# Patient Record
Sex: FEMALE | Race: WHITE
[De-identification: names, ages, dates, MRNs, and addresses within clinical notes are randomized per-mention and may not be internally consistent; named-entity substitution may affect disease eponyms.]

---

## 2018-06-30 ENCOUNTER — HOSPITAL ENCOUNTER (OUTPATIENT)
Dept: HOSPITAL 47 - EC | Age: 38
Setting detail: OBSERVATION
LOS: 1 days | Discharge: HOME | End: 2018-07-01
Attending: INTERNAL MEDICINE | Admitting: INTERNAL MEDICINE
Payer: COMMERCIAL

## 2018-06-30 VITALS — BODY MASS INDEX: 28 KG/M2

## 2018-06-30 VITALS — RESPIRATION RATE: 16 BRPM

## 2018-06-30 DIAGNOSIS — R49.0: ICD-10-CM

## 2018-06-30 DIAGNOSIS — Z82.49: ICD-10-CM

## 2018-06-30 DIAGNOSIS — Z83.3: ICD-10-CM

## 2018-06-30 DIAGNOSIS — Z85.41: ICD-10-CM

## 2018-06-30 DIAGNOSIS — L98.9: ICD-10-CM

## 2018-06-30 DIAGNOSIS — F17.200: ICD-10-CM

## 2018-06-30 DIAGNOSIS — D64.9: ICD-10-CM

## 2018-06-30 DIAGNOSIS — B35.3: ICD-10-CM

## 2018-06-30 DIAGNOSIS — L03.115: Primary | ICD-10-CM

## 2018-06-30 DIAGNOSIS — G43.909: ICD-10-CM

## 2018-06-30 DIAGNOSIS — M54.5: ICD-10-CM

## 2018-06-30 LAB
ALBUMIN SERPL-MCNC: 4.2 G/DL (ref 3.5–5)
ALP SERPL-CCNC: 75 U/L (ref 38–126)
ALT SERPL-CCNC: 25 U/L (ref 9–52)
ANION GAP SERPL CALC-SCNC: 13 MMOL/L
APTT BLD: 20.9 SEC (ref 22–30)
AST SERPL-CCNC: 21 U/L (ref 14–36)
BASOPHILS # BLD AUTO: 0 K/UL (ref 0–0.2)
BASOPHILS NFR BLD AUTO: 0 %
BUN SERPL-SCNC: 10 MG/DL (ref 7–17)
CALCIUM SPEC-MCNC: 9.1 MG/DL (ref 8.4–10.2)
CHLORIDE SERPL-SCNC: 105 MMOL/L (ref 98–107)
CO2 SERPL-SCNC: 21 MMOL/L (ref 22–30)
EOSINOPHIL # BLD AUTO: 0 K/UL (ref 0–0.7)
EOSINOPHIL NFR BLD AUTO: 0 %
ERYTHROCYTE [DISTWIDTH] IN BLOOD BY AUTOMATED COUNT: 4.32 M/UL (ref 3.8–5.4)
ERYTHROCYTE [DISTWIDTH] IN BLOOD: 13.1 % (ref 11.5–15.5)
GLUCOSE SERPL-MCNC: 100 MG/DL (ref 74–99)
HCT VFR BLD AUTO: 40.5 % (ref 34–46)
HGB BLD-MCNC: 13.9 GM/DL (ref 11.4–16)
INR PPP: 1 (ref ?–1.2)
KETONES UR QL STRIP.AUTO: (no result)
LYMPHOCYTES # SPEC AUTO: 0.6 K/UL (ref 1–4.8)
LYMPHOCYTES NFR SPEC AUTO: 7 %
MCH RBC QN AUTO: 32.2 PG (ref 25–35)
MCHC RBC AUTO-ENTMCNC: 34.4 G/DL (ref 31–37)
MCV RBC AUTO: 93.6 FL (ref 80–100)
MONOCYTES # BLD AUTO: 0.4 K/UL (ref 0–1)
MONOCYTES NFR BLD AUTO: 5 %
NEUTROPHILS # BLD AUTO: 7.5 K/UL (ref 1.3–7.7)
NEUTROPHILS NFR BLD AUTO: 86 %
PH UR: 6 [PH] (ref 5–8)
PLATELET # BLD AUTO: 289 K/UL (ref 150–450)
POTASSIUM SERPL-SCNC: 3.5 MMOL/L (ref 3.5–5.1)
PROT SERPL-MCNC: 6.8 G/DL (ref 6.3–8.2)
PT BLD: 9.7 SEC (ref 9–12)
RBC UR QL: 2 /HPF (ref 0–5)
SODIUM SERPL-SCNC: 139 MMOL/L (ref 137–145)
SP GR UR: 1 (ref 1–1.03)
SQUAMOUS UR QL AUTO: 1 /HPF (ref 0–4)
UROBILINOGEN UR QL STRIP: <2 MG/DL (ref ?–2)
WBC # BLD AUTO: 8.7 K/UL (ref 3.8–10.6)
WBC #/AREA URNS HPF: <1 /HPF (ref 0–5)

## 2018-06-30 PROCEDURE — 85027 COMPLETE CBC AUTOMATED: CPT

## 2018-06-30 PROCEDURE — 86140 C-REACTIVE PROTEIN: CPT

## 2018-06-30 PROCEDURE — 85652 RBC SED RATE AUTOMATED: CPT

## 2018-06-30 PROCEDURE — 93971 EXTREMITY STUDY: CPT

## 2018-06-30 PROCEDURE — 85025 COMPLETE CBC W/AUTO DIFF WBC: CPT

## 2018-06-30 PROCEDURE — 96365 THER/PROPH/DIAG IV INF INIT: CPT

## 2018-06-30 PROCEDURE — 87040 BLOOD CULTURE FOR BACTERIA: CPT

## 2018-06-30 PROCEDURE — 99285 EMERGENCY DEPT VISIT HI MDM: CPT

## 2018-06-30 PROCEDURE — 80053 COMPREHEN METABOLIC PANEL: CPT

## 2018-06-30 PROCEDURE — 96375 TX/PRO/DX INJ NEW DRUG ADDON: CPT

## 2018-06-30 PROCEDURE — 80048 BASIC METABOLIC PNL TOTAL CA: CPT

## 2018-06-30 PROCEDURE — 85610 PROTHROMBIN TIME: CPT

## 2018-06-30 PROCEDURE — 83605 ASSAY OF LACTIC ACID: CPT

## 2018-06-30 PROCEDURE — 85730 THROMBOPLASTIN TIME PARTIAL: CPT

## 2018-06-30 PROCEDURE — 87086 URINE CULTURE/COLONY COUNT: CPT

## 2018-06-30 PROCEDURE — 96376 TX/PRO/DX INJ SAME DRUG ADON: CPT

## 2018-06-30 PROCEDURE — 36415 COLL VENOUS BLD VENIPUNCTURE: CPT

## 2018-06-30 PROCEDURE — 81001 URINALYSIS AUTO W/SCOPE: CPT

## 2018-06-30 PROCEDURE — 96361 HYDRATE IV INFUSION ADD-ON: CPT

## 2018-06-30 PROCEDURE — 96372 THER/PROPH/DIAG INJ SC/IM: CPT

## 2018-06-30 RX ADMIN — NICARDIPINE HYDROCHLORIDE SCH MLS/HR: 2.5 INJECTION INTRAVENOUS at 11:00

## 2018-06-30 RX ADMIN — ACETAMINOPHEN AND CODEINE PHOSPHATE PRN EACH: 300; 30 TABLET ORAL at 23:30

## 2018-06-30 RX ADMIN — MORPHINE SULFATE PRN MG: 2 INJECTION, SOLUTION INTRAMUSCULAR; INTRAVENOUS at 20:17

## 2018-06-30 RX ADMIN — CEFAZOLIN SODIUM SCH MLS/HR: 1 INJECTION, SOLUTION INTRAVENOUS at 23:27

## 2018-06-30 RX ADMIN — CEFAZOLIN SODIUM SCH MLS/HR: 1 INJECTION, SOLUTION INTRAVENOUS at 17:50

## 2018-06-30 RX ADMIN — CEFAZOLIN SCH MLS/HR: 330 INJECTION, POWDER, FOR SOLUTION INTRAMUSCULAR; INTRAVENOUS at 15:54

## 2018-06-30 RX ADMIN — NICARDIPINE HYDROCHLORIDE SCH MLS/HR: 2.5 INJECTION INTRAVENOUS at 11:01

## 2018-06-30 RX ADMIN — ACETAMINOPHEN AND CODEINE PHOSPHATE PRN EACH: 300; 30 TABLET ORAL at 17:54

## 2018-06-30 RX ADMIN — MORPHINE SULFATE PRN MG: 2 INJECTION, SOLUTION INTRAMUSCULAR; INTRAVENOUS at 15:54

## 2018-06-30 RX ADMIN — HEPARIN SODIUM SCH UNIT: 5000 INJECTION, SOLUTION INTRAVENOUS; SUBCUTANEOUS at 23:27

## 2018-06-30 RX ADMIN — FAMOTIDINE SCH MG: 20 TABLET, FILM COATED ORAL at 20:18

## 2018-06-30 NOTE — US
EXAMINATION TYPE: US venous doppler duplex LE RT

 

DATE OF EXAM: 6/30/2018 11:35 AM

 

COMPARISON: NONE

 

CLINICAL HISTORY: Pain.

 

SIDE PERFORMED: Right  

 

TECHNIQUE:  The lower extremity deep venous system is examined utilizing real time linear array sonog
connie with graded compression, doppler sonography and color-flow sonography.

 

VESSELS IMAGED:

External Iliac Vein (EIV)

Common Femoral Vein

Deep Femoral Vein

Greater Saphenous Vein *

Femoral Vein

Popliteal Vein

Small Saphenous Vein *

Proximal Calf Veins

(* superficial vessels)

 

Grayscale, color Doppler, spectral Doppler imaging performed of the deep veins of the right lower ext
remity. Normal color flow, spectral waveform, compressibility noted of the deep veins of the right lo
wer extremity.

 

Right Leg:  Negative for DVT

 

 

IMPRESSION:   No evident deep venous arthrosis at or above the right knee

## 2018-06-30 NOTE — ED
Extremity Problem HPI





- General


Chief complaint: Extremity Problem,Nontraumatic


Stated complaint: right leg pain


Time Seen by Provider: 18 10:11


Source: patient, RN notes reviewed, old records reviewed


Mode of arrival: wheelchair


Limitations: no limitations





- History of Present Illness


Initial comments: 





Patient is a 37-year-old female presents emergency department today fevers 

chills and right leg pain.  She reports her pain feels similar to when she had 

cellulitis only before.  She reports that she has pain in the groin rating down 

the entire leg.  She reports that she's not had any Motrin or Tylenol or 

anything for pain relief.  Patient states that she does have some mild lower 

back pain.  Patient states that she was told that it is likely comfortable 

infection over her toes.  She denies any new cuts or breaks in the skin.  No 

falls.





- Related Data


 Home Medications











 Medication  Instructions  Recorded  Confirmed


 


No Known Home Medications  16











 Allergies











Allergy/AdvReac Type Severity Reaction Status Date / Time


 


No Known Allergies Allergy   Verified 18 10:10














Review of Systems


ROS Statement: 


Those systems with pertinent positive or pertinent negative responses have been 

documented in the HPI.





ROS Other: All systems not noted in ROS Statement are negative.





Past Medical History


Past Medical History: Cancer, Skin Disorder


Additional Past Medical History / Comment(s): migraines, hoarse voice and diff 

swallowing-recent choking, dry skin on foot, hx cellulitis, anemia, hx cervical 

cancer


History of Any Multi-Drug Resistant Organisms: None Reported


Past Surgical History:  Section, Orthopedic Surgery


Additional Past Surgical History / Comment(s): LEEP procedure, left knee 

arthroscopy, oral surgery


Past Anesthesia/Blood Transfusion Reactions: Previous Problems w/ Anesthesia


Additional Past Anesthesia/Blood Transfusion Reaction / Comment(s): spinal 

anesthesia for C/S-"stopped breathing/paralyzed lungs"


Past Psychological History: No Psychological Hx Reported


Smoking Status: Current some day smoker


Past Alcohol Use History: None Reported


Past Drug Use History: None Reported





- Past Family History


  ** Father


Family Medical History: Diabetes Mellitus, Hypertension





  ** Mother


Family Medical History: No Reported History





General Exam





- General Exam Comments


Initial Comments: 





Physical 37-year-old female.  Alert and oriented.  No acute distress.


General: Well appearing, well nourished, in no distress. Oriented x 3, normal 

mood and affect . Ambulating without difficulty. 


Skin: Good turgor, no rash, unusual bruising or prominent lesions 


Hair: Normal texture and distribution. 


HEENT: Head: Normocephalic, atraumatic, no visible or palpable masses, 

depressions, or scaring.


 Eyes: Visual acuity intact, conjunctiva clear, sclera non-icteric, EOM intact, 

PERRL. 


Ears: EACs clear, TMs translucent & cone of light visualized. hearing intact. 


Nose: No external lesions, mucosa non-inflamed, septum and turbinates normal 


Mouth: Mucous membranes moist, no mucosal lesions. 


Teeth/Gums: No obvious caries or periodontal disease. No gingival inflammation 

or significant resorption. 


Pharynx: Mucosa non-inflamed, no tonsillar hypertrophy or exudate 


Neck: Supple, without lesions, bruits, or adenopathy, thyroid non-enlarged and 

non-tender 


Heart: No cardiomegaly or thrills; regular rate and rhythm, no murmur or gallop 


Lungs: Clear to auscultation and percussion 


Abdomen: Bowel sounds normal, no tenderness, organomegaly, masses, or hernia 


Back: Spine normal without deformity or tenderness, no CVA tenderness 


Extremities: No amputations or deformities.  Patient reports pain over the 

right lower extremity.  Patient is tender over the right hip, but she is tender 

over the entire leg.  Full range of motion noted of the toes ankle and knee.  

Mild erythema over the lateral aspect of the shin.  Pulses are normal 

bilaterally.  Neurovascularly intact.


Musculoskeletal: Normal gait and station. No misalignment, asymmetry, 

crepitation, defects, tenderness, masses, effusions, decreased range of motion, 

instability, atrophy or abnormal strength or tone in the head, neck, spine, ribs

, pelvis or extremities. 


Neurologic: CN 2-12 normal. Sensation to pain, touch, and proprioception 

normal. DTRs normal in upper and lower extremities. No pathologic reflexes. 


Psychiatric: Oriented X3, intact recent and remote memory, judgment and insight

, normal mood and affect.





Limitations: no limitations





Course


 Vital Signs











  18





  10:07 11:44


 


Temperature 99.2 F 


 


Pulse Rate 100 92


 


Respiratory 24 18





Rate  


 


Blood Pressure 126/85 106/56


 


O2 Sat by Pulse 100 98





Oximetry  














Medical Decision Making





- Medical Decision Making





37-year-old female with history of cellulitis on her right leg.  She presents 

today with severe pain over the right leg.  Some mild erythema over the lateral 

aspect of the calf.  Ultrasound was negative for DVT.  No strain or falls no 

trauma.  She has full range of motion of the leg which she does report pain.  

Patient to arrive with a low-grade temperature of 100.0.  Her lab work shows an 

elevated CRP and ESR.  White blood cell count is within normal limits.  Dr. Jones

, pulse was examined the Patient.  At this time I'll put the Patient on Ancef.  

Admitted for right leg cellulitis over the right calf.  With pain control and 

IV fluids.





- Lab Data


Result diagrams: 


 18 10:42





 18 10:42


 Lab Results











  18 Range/Units





  10:42 10:42 10:42 


 


WBC  8.7    (3.8-10.6)  k/uL


 


RBC  4.32    (3.80-5.40)  m/uL


 


Hgb  13.9    (11.4-16.0)  gm/dL


 


Hct  40.5    (34.0-46.0)  %


 


MCV  93.6    (80.0-100.0)  fL


 


MCH  32.2    (25.0-35.0)  pg


 


MCHC  34.4    (31.0-37.0)  g/dL


 


RDW  13.1    (11.5-15.5)  %


 


Plt Count  289    (150-450)  k/uL


 


Neutrophils %  86    %


 


Lymphocytes %  7    %


 


Monocytes %  5    %


 


Eosinophils %  0    %


 


Basophils %  0    %


 


Neutrophils #  7.5    (1.3-7.7)  k/uL


 


Lymphocytes #  0.6 L    (1.0-4.8)  k/uL


 


Monocytes #  0.4    (0-1.0)  k/uL


 


Eosinophils #  0.0    (0-0.7)  k/uL


 


Basophils #  0.0    (0-0.2)  k/uL


 


ESR  38 H    (0-20)  mm/hr


 


PT    9.7  (9.0-12.0)  sec


 


INR    1.0  (<1.2)  


 


APTT    20.9 L  (22.0-30.0)  sec


 


Sodium   139   (137-145)  mmol/L


 


Potassium   3.5   (3.5-5.1)  mmol/L


 


Chloride   105   ()  mmol/L


 


Carbon Dioxide   21 L   (22-30)  mmol/L


 


Anion Gap   13   mmol/L


 


BUN   10   (7-17)  mg/dL


 


Creatinine   0.80   (0.52-1.04)  mg/dL


 


Est GFR (CKD-EPI)AfAm   >90   (>60 ml/min/1.73 sqM)  


 


Est GFR (CKD-EPI)NonAf   >90   (>60 ml/min/1.73 sqM)  


 


Glucose   100 H   (74-99)  mg/dL


 


Calcium   9.1   (8.4-10.2)  mg/dL


 


Total Bilirubin   0.6   (0.2-1.3)  mg/dL


 


AST   21   (14-36)  U/L


 


ALT   25   (9-52)  U/L


 


Alkaline Phosphatase   75   ()  U/L


 


C-Reactive Protein   181.4 H   (<10.0)  mg/L


 


Total Protein   6.8   (6.3-8.2)  g/dL


 


Albumin   4.2   (3.5-5.0)  g/dL














- Radiology Data


Radiology results: report reviewed


Ultrasound is negative for any DVT.





Disposition


Clinical Impression: 


 Cellulitis of right leg





Disposition: HOME SELF-CARE


Condition: Good


Is patient prescribed a controlled substance at d/c from ED?: No


When asked, does pt state using other controlled substances?: No


If prescribed controlled substance>3 days was MAPS reviewed?: No


If opioid is for acute pain is fill amount 7 days or less?: No


If Rx opioid, was Start Talking consent form obtained?: No


Referrals: 


Castro Mendez MD [Primary Care Provider] - 1-2 days


Time of Disposition: 13:13

## 2018-06-30 NOTE — P.HPIM
History of Present Illness


Patient was an 37-year-old female came in with compensative for redness in the 

right leg fever chills has been going on since last Thursday.  Patient is 

negative for DVT in the right leg.  Patient although redness in the right leg 

is not significant patient does have significant lymphangitic streaking, never 

had any history of MRSA in the past.  Patient does have interdigital, toe skin 

breakdown and possible fungal infection which is probably the nidus of her 

cellulitis.  We'll consult infectious disease regarding local wound care for 

interdigital skin breakdown.  Patient will be started on ceftezole and.  With 

the GI prophylaxis and DVT prophylaxis.  Patient had fever at home.





Review of Systems


REVIEW OF SYSTEMS: 


CONSTITUTIONAL:  no malaise, no fatigue. 


HEENT: No recent visual problems or hearing problems. Denied any sore throat. 


CARDIOVASCULAR: No chest pain, orthopnea, PND, no palpitations, no syncope. 


PULMONARY: No shortness of breath, no cough, no hemoptysis. 


GASTROINTESTINAL: No diarrhea, no nausea, no vomiting, no abdominal pain. 

Normoactive bowel sounds. 


NEUROLOGICAL: No headaches, no weakness, no numbness. 


HEMATOLOGICAL: Denies any bleeding or petechiae. 


GENITOURINARY: Denies any burning micturition, frequency, or urgency. 


MUSCULOSKELETAL/RHEUMATOLOGICAL: Denies any joint pain, swelling, or any muscle 

pain. 


ENDOCRINE: Denies any polyuria or polydipsia. 





The rest of the 14-point review of systems is negative.











Past Medical History


Past Medical History: Cancer, Skin Disorder


Additional Past Medical History / Comment(s): migraines, hoarse voice and diff 

swallowing-recent choking, dry skin on foot, hx cellulitis, anemia, hx cervical 

cancer


History of Any Multi-Drug Resistant Organisms: None Reported


Past Surgical History:  Section, Orthopedic Surgery


Additional Past Surgical History / Comment(s): LEEP procedure, left knee 

arthroscopy, oral surgery


Past Anesthesia/Blood Transfusion Reactions: Previous Problems w/ Anesthesia


Additional Past Anesthesia/Blood Transfusion Reaction / Comment(s): spinal 

anesthesia for C/S-"stopped breathing/paralyzed lungs"


Past Psychological History: No Psychological Hx Reported


Smoking Status: Current some day smoker


Past Alcohol Use History: None Reported


Additional Past Alcohol Use History / Comment(s): occ smoking


Past Drug Use History: None Reported





- Past Family History


  ** Father


Family Medical History: Diabetes Mellitus, Hypertension





  ** Mother


Family Medical History: No Reported History





Medications and Allergies


 Home Medications











 Medication  Instructions  Recorded  Confirmed  Type


 


Ibuprofen [Motrin Ib] 200 mg PO Q6H PRN 18 History











 Allergies











Allergy/AdvReac Type Severity Reaction Status Date / Time


 


No Known Allergies Allergy   Verified 18 13:39














Physical Exam


Vitals: 


 Vital Signs











  Temp Pulse Resp BP Pulse Ox


 


 18 14:35  98.6 F  88  16  111/59  99


 


 18 13:27  98.6 F  85  18  112/59  99


 


 18 11:44   92  18  106/56  98


 


 18 10:07  99.2 F  100  24  126/85  100








 Intake and Output











 18





 06:59 14:59 22:59


 


Other:   


 


  Weight  63.503 kg 65 kg











PHYSICAL EXAMINATION: 





GENERAL: The patient is alert and oriented x3, not in any acute distress. Well 

developed, well nourished. 


HEENT: Pupils are round and equally reacting to light. EOMI. No scleral 

icterus. No conjunctival pallor. Normocephalic, atraumatic. No pharyngeal 

erythema. No thyromegaly. 


CARDIOVASCULAR: S1 and S2 present. No murmurs, rubs, or gallops. 


PULMONARY: Chest is clear to auscultation, no wheezing or crackles. 


ABDOMEN: Soft, nontender, nondistended, normoactive bowel sounds. No palpable 

organomegaly. 


MUSCULOSKELETAL: No joint swelling or deformity.


EXTREMITIES: No cyanosis, clubbing, or pedal edema. 


NEUROLOGICAL: Gross neurological examination did not reveal any focal deficits. 


SKIN: As mentioned in HPI











Results


CBC & Chem 7: 


 18 10:42





 18 10:42


Labs: 


 Abnormal Lab Results - Last 24 Hours (Table)











  18 Range/Units





  10:42 10:42 10:42 


 


Lymphocytes #  0.6 L    (1.0-4.8)  k/uL


 


ESR  38 H    (0-20)  mm/hr


 


APTT    20.9 L  (22.0-30.0)  sec


 


Carbon Dioxide   21 L   (22-30)  mmol/L


 


Glucose   100 H   (74-99)  mg/dL


 


C-Reactive Protein   181.4 H   (<10.0)  mg/L


 


Urine Ketones     (Negative)  


 


Urine Blood     (Negative)  


 


Urine Bacteria     (None)  /hpf


 


Urine Mucus     (None)  /hpf














  18 Range/Units





  13:04 


 


Lymphocytes #   (1.0-4.8)  k/uL


 


ESR   (0-20)  mm/hr


 


APTT   (22.0-30.0)  sec


 


Carbon Dioxide   (22-30)  mmol/L


 


Glucose   (74-99)  mg/dL


 


C-Reactive Protein   (<10.0)  mg/L


 


Urine Ketones  1+ H  (Negative)  


 


Urine Blood  Small H  (Negative)  


 


Urine Bacteria  Rare H  (None)  /hpf


 


Urine Mucus  Rare H  (None)  /hpf














Thrombosis Risk Factor Assmnt





- Choose All That Apply


Any of the Below Risk Factors Present?: No


Other Risk Factors: No


Other congenital or acquired thrombophilia - If yes, enter type in comment: No


Thrombosis Risk Factor Assessment Level: Very Low Risk





Assessment and Plan


Plan: 


-Cellulitis of the right lower extremity: Most probably streptococcal in nature 

patient was started on ceftezole and IV fluids will be continued.  Repeat CBC 

tomorrow.


-Interdigital fungal infection: Local wound care as per infectious disease


-Ruled out DVT


-Nicotine abuse: Counseling was provided.

## 2018-07-01 VITALS — DIASTOLIC BLOOD PRESSURE: 57 MMHG | TEMPERATURE: 97.9 F | HEART RATE: 80 BPM | SYSTOLIC BLOOD PRESSURE: 92 MMHG

## 2018-07-01 LAB
ANION GAP SERPL CALC-SCNC: 7 MMOL/L
BUN SERPL-SCNC: 7 MG/DL (ref 7–17)
CALCIUM SPEC-MCNC: 7 MG/DL (ref 8.4–10.2)
CHLORIDE SERPL-SCNC: 112 MMOL/L (ref 98–107)
CO2 SERPL-SCNC: 23 MMOL/L (ref 22–30)
ERYTHROCYTE [DISTWIDTH] IN BLOOD BY AUTOMATED COUNT: 3.21 M/UL (ref 3.8–5.4)
ERYTHROCYTE [DISTWIDTH] IN BLOOD: 13.2 % (ref 11.5–15.5)
GLUCOSE SERPL-MCNC: 86 MG/DL (ref 74–99)
HCT VFR BLD AUTO: 30.9 % (ref 34–46)
HGB BLD-MCNC: 10.1 GM/DL (ref 11.4–16)
MCH RBC QN AUTO: 31.6 PG (ref 25–35)
MCHC RBC AUTO-ENTMCNC: 32.8 G/DL (ref 31–37)
MCV RBC AUTO: 96.3 FL (ref 80–100)
PLATELET # BLD AUTO: 213 K/UL (ref 150–450)
POTASSIUM SERPL-SCNC: 3.6 MMOL/L (ref 3.5–5.1)
SODIUM SERPL-SCNC: 142 MMOL/L (ref 137–145)
WBC # BLD AUTO: 4.6 K/UL (ref 3.8–10.6)

## 2018-07-01 RX ADMIN — CEFAZOLIN SODIUM SCH MLS/HR: 1 INJECTION, SOLUTION INTRAVENOUS at 12:20

## 2018-07-01 RX ADMIN — FAMOTIDINE SCH MG: 20 TABLET, FILM COATED ORAL at 08:12

## 2018-07-01 RX ADMIN — ACETAMINOPHEN AND CODEINE PHOSPHATE PRN EACH: 300; 30 TABLET ORAL at 04:58

## 2018-07-01 RX ADMIN — CEFAZOLIN SCH MLS/HR: 330 INJECTION, POWDER, FOR SOLUTION INTRAMUSCULAR; INTRAVENOUS at 05:01

## 2018-07-01 RX ADMIN — HEPARIN SODIUM SCH UNIT: 5000 INJECTION, SOLUTION INTRAVENOUS; SUBCUTANEOUS at 08:12

## 2018-07-01 RX ADMIN — CEFAZOLIN SCH MLS/HR: 330 INJECTION, POWDER, FOR SOLUTION INTRAMUSCULAR; INTRAVENOUS at 12:21

## 2018-07-01 RX ADMIN — CEFAZOLIN SODIUM SCH MLS/HR: 1 INJECTION, SOLUTION INTRAVENOUS at 05:28

## 2018-07-01 NOTE — P.CONS
History of Present Illness





- Reason for Consult


Consult date: 18





- History of Present Illness


29-year-old  female known to infectious disease service now for her 

fifth episode of cellulitis the right lower extremity.  She relates that the 

ages 16 she had her first episode.  He does continue to have intermittent bouts 

of this.  Is a chronic skin condition thought to be either eczema or psoriasis 

that then becomes secondarily fungally infected.  His this occurs she then 

develops the significant cellulitis and ascending lymphangitis.  At home she 

had onset of significant pain and swelling to the limb and because of her 

history she presented to Hospital and was admitted for further intervention.  

At this time fortunately she started to show improvement already.  The case is 

discussed with with the hospitalist.


Patient is currently with much improved symptoms.  She is having no high-grade 

fever chills or rigors.  Pain is improved.  She believes that she would be able 

to take oral antibiotic therapy.





Review of Systems


HEENT:Denies headache or acute visual change.  Denies sinus or mouth 

discomforts.  Denies neck stiffness or pain.  Denies significant oral cavity 

pain.  Denies difficulty on swallowing.





Lungs: Denies significant shortness of breath, cough, sputum production, or 

hemoptysis.





Cardiovascular: Denies significant shortness of breath, chest pain, chest wall 

pain, 


orthopnea, dyspnea on exertion, syncope





Gastrointestinal:Denies nausea, vomiting, diarrhea, constipation, hematemesis, 

melena, hematochezia.  No no significant change of bowel habit noticed.





Musculoskeletal: denies significant myalgias or arthralgias.  No new joint 

swelling.  Denies new back pain.





Skin: Chronic rash right foot at the lateral surface of the toes 





Neuro: Denies headache or visual change.  Denies any new onset weakness or 

difficulty with ambulation.  Denies falls or seizures.





Psychiatric:Denies anxiety or depression.





Endocrine: Denies significant fatigue, denies significant weight loss or weight 

gain.




















Past Medical History


Past Medical History: Cancer, Skin Disorder


Additional Past Medical History / Comment(s): migraines, hoarse voice and diff 

swallowing-recent choking, dry skin on foot, hx cellulitis, anemia, hx cervical 

cancer


History of Any Multi-Drug Resistant Organisms: None Reported


Past Surgical History:  Section, Orthopedic Surgery


Additional Past Surgical History / Comment(s): LEEP procedure, left knee 

arthroscopy, oral surgery


Past Anesthesia/Blood Transfusion Reactions: Previous Problems w/ Anesthesia


Additional Past Anesthesia/Blood Transfusion Reaction / Comm: spinal anesthesia 

for C/S-"stopped breathing/paralyzed lungs"


Past Psychological History: No Psychological Hx Reported


Additional Psychological History / Comment(s): She has been a smoker on and off 

for 20 years.  She denies any medical marijuana, marijuana, street drug use.  

She denies any alcohol use.  She lives at home with family members and her 

children.  There are no pets in the home.  She has had no recent travel.  Past 

Drug Use History: None Reported


Smoking Status: Current some day smoker


Past Alcohol Use History: None Reported


Additional Past Alcohol Use History / Comment(s): occ smoking


Past Drug Use History: None Reported





- Past Family History


  ** Father


Family Medical History: Diabetes Mellitus, Hypertension





  ** Mother


Family Medical History: No Reported History





Medications and Allergies


Home Medications and Allergies Comment(s): 


Please see the medication list.





 Home Medications











 Medication  Instructions  Recorded  Confirmed  Type


 


Ibuprofen [Motrin Ib] 200 mg PO Q6H PRN 18 History


 


Cephalexin [Keflex] 500 mg PO Q8HR #21 cap 18  Rx











 Allergies











Allergy/AdvReac Type Severity Reaction Status Date / Time


 


No Known Allergies Allergy   Verified 18 13:39














Physical Exam


Vitals: 


 Vital Signs











  Temp Pulse Resp BP Pulse Ox


 


 18 05:00  97.9 F  80  16  92/57  93 L


 


 18 22:33  99 F  76  16  101/66  98


 


 18 15:35  98.0 F  70  16  98/64  97








 Intake and Output











 18





 06:59 14:59 22:59


 


Intake Total 1000 1110 


 


Balance 1000 1110 


 


Intake:   


 


  Intake, IV Titration 1000 1110 





  Amount   


 


    Sodium Chloride 0.9% 1, 900 960 





    000 ml @ 120 mls/hr IV .   





    Q8H20M [facility] Rx#:[id number]   


 


    ceFAZolin 1,000 mg In 100 150 





    Dextrose/Water 1 50ml.bag   





    @ 100 mls/hr IVPB Q6HR   





    [facility] Rx#:[id number]   


 


Other:   


 


  Voiding Method Toilet  


 


  # Voids 2  











Pleasant 29-year-old woman who is not in acute distress.


HEENT: Anicteric conjunctiva are pink and moist nasal mucosa grossly intact 

without significant lesions, there is no thrush.


Neck: The neck is supple without significant lymphadenopathy or thyromegaly.


Lungs: Good bilateral air entry without significant crackles or wheezing.  

There is no significant bronchial sounds.  There is no egophony or dullness.


Heart: Regular rate and rhythm with an audible S1-S2, no S3 no S4.  There is no 

significant murmur click or rub, PMI was nondisplaced.


Abdomen: Positive bowel sounds soft and nontender without palpable masses or 

organomegaly.  There was no guarding or rebound.


Extremities: The upper extremities have excellent pulses they are symmetric, no 

significant petechiae or telangiectasia.  No splinter hemorrhages were noted.  

The left lower extremity has no evidence of any edema.  The right lower 

extremity has evidence of some minimal edema to the distal aspect of the right 

foot which is evidence of the significant dryness and fungal process possibly 

superimposed on the chronic eczema-type condition.  There is no expressible 

purulence.  It is minimally tender.  There is minimal erythema to the site.  

There is no ascending lymphangitis.  There is no tenderness in the right 

inguinal area and no lymphadenopathy is noted in the right inguinal area and no 

other abnormal lymph nodes are noted at this time.


Neuro: Awake alert oriented to person place and time.  There are no acute new 

gross focal sensory motor deficits.











Results


CBC & Chem 7: 


 18 08:02





 18 08:02


Labs: 


 Abnormal Lab Results - Last 24 Hours (Table)











  18 Range/Units





  08:02 08:02 


 


RBC  3.21 L   (3.80-5.40)  m/uL


 


Hgb  10.1 L D   (11.4-16.0)  gm/dL


 


Hct  30.9 L   (34.0-46.0)  %


 


Chloride   112 H  ()  mmol/L


 


Calcium   7.0 L  (8.4-10.2)  mg/dL








 Microbiology - Last 24 Hours (Table)











 18 10:42 Blood Culture - Preliminary





 Blood    No Growth after 24 hours


 


 18 13:04 Urine Culture - Preliminary





 Urine,Clean Catch 








 Laboratory Results











WBC  4.6 k/uL (3.8-10.6)   18  08:02    


 


RBC  3.21 m/uL (3.80-5.40)  L  18  08:02    


 


Hgb  10.1 gm/dL (11.4-16.0)  L D 18  08:02    


 


Hct  30.9 % (34.0-46.0)  L  18  08:02    


 


MCV  96.3 fL (80.0-100.0)   18  08:02    


 


MCH  31.6 pg (25.0-35.0)   18  08:02    


 


MCHC  32.8 g/dL (31.0-37.0)   18  08:02    


 


RDW  13.2 % (11.5-15.5)   18  08:02    


 


Plt Count  213 k/uL (150-450)   18  08:02    


 


Neutrophils %  86 %  18  10:42    


 


Lymphocytes %  7 %  18  10:42    


 


Monocytes %  5 %  18  10:42    


 


Eosinophils %  0 %  18  10:42    


 


Basophils %  0 %  18  10:42    


 


Neutrophils #  7.5 k/uL (1.3-7.7)   18  10:42    


 


Lymphocytes #  0.6 k/uL (1.0-4.8)  L  18  10:42    


 


Monocytes #  0.4 k/uL (0-1.0)   18  10:42    


 


Eosinophils #  0.0 k/uL (0-0.7)   18  10:42    


 


Basophils #  0.0 k/uL (0-0.2)   18  10:42    


 


ESR  38 mm/hr (0-20)  H  18  10:42    


 


PT  9.7 sec (9.0-12.0)   18  10:42    


 


INR  1.0  (<1.2)   18  10:42    


 


APTT  20.9 sec (22.0-30.0)  L  18  10:42    


 


Sodium  142 mmol/L (137-145)   18  08:02    


 


Potassium  3.6 mmol/L (3.5-5.1)   18  08:02    


 


Chloride  112 mmol/L ()  H  18  08:02    


 


Carbon Dioxide  23 mmol/L (22-30)   18  08:02    


 


Anion Gap  7 mmol/L  18  08:02    


 


BUN  7 mg/dL (7-17)   18  08:02    


 


Creatinine  0.74 mg/dL (0.52-1.04)   18  08:02    


 


Est GFR (CKD-EPI)AfAm  >90  (>60 ml/min/1.73 sqM)   18  08:02    


 


Est GFR (CKD-EPI)NonAf  >90  (>60 ml/min/1.73 sqM)   18  08:02    


 


Glucose  86 mg/dL (74-99)   18  08:02    


 


Plasma Lactic Acid Antonio  1.1 mmol/L (0.7-2.0)   18  10:42    


 


Calcium  7.0 mg/dL (8.4-10.2)  L  18  08:02    


 


Total Bilirubin  0.6 mg/dL (0.2-1.3)   18  10:42    


 


AST  21 U/L (14-36)   18  10:42    


 


ALT  25 U/L (9-52)   18  10:42    


 


Alkaline Phosphatase  75 U/L ()   18  10:42    


 


C-Reactive Protein  181.4 mg/L (<10.0)  H  18  10:42    


 


Total Protein  6.8 g/dL (6.3-8.2)   18  10:42    


 


Albumin  4.2 g/dL (3.5-5.0)   18  10:42    


 


Urine Color  Colorless   18  13:04    


 


Urine Appearance  Clear  (Clear)   18  13:04    


 


Urine pH  6.0  (5.0-8.0)   18  13:04    


 


Ur Specific Gravity  1.003  (1.001-1.035)   18  13:04    


 


Urine Protein  Negative  (Negative)   18  13:04    


 


Urine Glucose (UA)  Negative  (Negative)   18  13:04    


 


Urine Ketones  1+  (Negative)  H  18  13:04    


 


Urine Blood  Small  (Negative)  H  18  13:04    


 


Urine Nitrite  Negative  (Negative)   18  13:04    


 


Urine Bilirubin  Negative  (Negative)   18  13:04    


 


Urine Urobilinogen  <2.0 mg/dL (<2.0)   18  13:04    


 


Ur Leukocyte Esterase  Negative  (Negative)   18  13:04    


 


Urine RBC  2 /hpf (0-5)   18  13:04    


 


Urine WBC  <1 /hpf (0-5)   18  13:04    


 


Ur Squamous Epith Cells  1 /hpf (0-4)   18  13:04    


 


Urine Bacteria  Rare /hpf (None)  H  18  13:04    


 


Urine Mucus  Rare /hpf (None)  H  18  13:04    








Microbiology





18 10:42   Blood   Blood Culture - Preliminary


                            No Growth after 24 hours


18 13:04   Urine,Clean Catch   Urine Culture - Preliminary











Assessment and Plan


(1) Cellulitis of right leg


Narrative/Plan: 


37-year-old woman who has a history of recurrent cellulitis of right lower 

extremity possibly in the basis of the chronic skin condition that causes skin 

breakdown, secondary fungal infection and then the invasive bacterial 

infection.  At this time the episode is quite mild compared to the one from 4 

years ago.  She is doing well on current antibiotic therapy.  He was discharged 

home on oral Keflex, but also placed on some oral Diflucan for short period of 

time to more effectively deal with the secondary fungal infection.  Topical 

antifungal can also be applied for some powders from over-the-counter.  His 

been 4 years since her last episode does appear to have better control of this 

issue.  Hopefully she'll keep the area bit more dry and prevent further 

secondary infections.


Status: Acute   Code(s): L03.115 - CELLULITIS OF RIGHT LOWER LIMB   SNOMED Code(

s): 751979165

## 2018-07-01 NOTE — P.DS
Providers


Date of admission: 


06/30/18 13:30





Attending physician: 


Pee Donis





Consults: 





 





06/30/18 16:47


Consult Physician Routine 


   Consulting Provider: Jhonny Pena Reason/Comments: Cellulitis and interdigital (toe) infection


   Do you want consulting provider notified?: Yes











Primary care physician: 


Vanessa Erazo





[facility] Course: 


31-year-old admitted with right lower limb cellulitis.  Patient has significant 

improvement in symptoms on ceftezole and patient will be discharged on Keflex 

for a week.  Awaiting Dr. Pena recommendations regarding interdigital fungal 

infection of the right leg toes.  Once a valid by infectious disease patient 

will be discharged on Keflex.





PHYSICAL EXAMINATION: 





GENERAL: The patient is alert and oriented x3, not in any acute distress. Well 

developed, well nourished. 


HEENT: Pupils are round and equally reacting to light. EOMI. No scleral 

icterus. No conjunctival pallor. Normocephalic, atraumatic. No pharyngeal 

erythema. No thyromegaly. 


CARDIOVASCULAR: S1 and S2 present. No murmurs, rubs, or gallops. 


PULMONARY: Chest is clear to auscultation, no wheezing or crackles. 


ABDOMEN: Soft, nontender, nondistended, normoactive bowel sounds. No palpable 

organomegaly. 


MUSCULOSKELETAL: No joint swelling or deformity.


EXTREMITIES: No cyanosis, clubbing, or pedal edema. 


NEUROLOGICAL: Gross neurological examination did not reveal any focal deficits. 


SKIN: Redness in the right leg signify failure improved patient does have 

interdigital fungal infection.





Assessment and Plan


Plan: 


-Cellulitis of the right lower extremity: Most probably streptococcal in nature 


-Interdigital fungal infection: Local wound care as per infectious disease


-Ruled out DVT


-Nicotine abuse: Counseling was provided.








Patient Condition at Discharge: Good





Plan - Discharge Summary


New Discharge Prescriptions: 


New


   Cephalexin [Keflex] 500 mg PO Q8HR #21 cap





No Action


   Ibuprofen [Motrin Ib] 200 mg PO Q6H PRN


     PRN Reason: Pain


Discharge Medication List





Ibuprofen [Motrin Ib] 200 mg PO Q6H PRN 06/30/18 [History]


Cephalexin [Keflex] 500 mg PO Q8HR #21 cap 07/01/18 [Rx]








Follow up Appointment(s)/Referral(s): 


Castro Mendez MD [Primary Care Provider] - 3 Days


Discharge Disposition: HOME SELF-CARE

## 2018-12-15 ENCOUNTER — HOSPITAL ENCOUNTER (INPATIENT)
Dept: HOSPITAL 47 - EC | Age: 38
LOS: 5 days | Discharge: HOME | DRG: 871 | End: 2018-12-20
Attending: HOSPITALIST | Admitting: HOSPITALIST
Payer: COMMERCIAL

## 2018-12-15 VITALS — BODY MASS INDEX: 28.8 KG/M2

## 2018-12-15 DIAGNOSIS — F17.200: ICD-10-CM

## 2018-12-15 DIAGNOSIS — E87.70: ICD-10-CM

## 2018-12-15 DIAGNOSIS — B35.3: ICD-10-CM

## 2018-12-15 DIAGNOSIS — D64.9: ICD-10-CM

## 2018-12-15 DIAGNOSIS — Z83.3: ICD-10-CM

## 2018-12-15 DIAGNOSIS — J96.01: ICD-10-CM

## 2018-12-15 DIAGNOSIS — M19.90: ICD-10-CM

## 2018-12-15 DIAGNOSIS — A40.0: Primary | ICD-10-CM

## 2018-12-15 DIAGNOSIS — K21.9: ICD-10-CM

## 2018-12-15 DIAGNOSIS — Z98.891: ICD-10-CM

## 2018-12-15 DIAGNOSIS — E83.42: ICD-10-CM

## 2018-12-15 DIAGNOSIS — L03.115: ICD-10-CM

## 2018-12-15 DIAGNOSIS — R65.21: ICD-10-CM

## 2018-12-15 DIAGNOSIS — E87.2: ICD-10-CM

## 2018-12-15 DIAGNOSIS — Z71.6: ICD-10-CM

## 2018-12-15 DIAGNOSIS — G43.909: ICD-10-CM

## 2018-12-15 DIAGNOSIS — Z86.19: ICD-10-CM

## 2018-12-15 DIAGNOSIS — J90: ICD-10-CM

## 2018-12-15 DIAGNOSIS — R13.10: ICD-10-CM

## 2018-12-15 DIAGNOSIS — Z85.41: ICD-10-CM

## 2018-12-15 DIAGNOSIS — R49.0: ICD-10-CM

## 2018-12-15 DIAGNOSIS — Z82.49: ICD-10-CM

## 2018-12-15 LAB
ALBUMIN SERPL-MCNC: 4.2 G/DL (ref 3.5–5)
ALP SERPL-CCNC: 67 U/L (ref 38–126)
ALT SERPL-CCNC: 25 U/L (ref 9–52)
ANION GAP SERPL CALC-SCNC: 14 MMOL/L
APTT BLD: 19 SEC (ref 22–30)
AST SERPL-CCNC: 30 U/L (ref 14–36)
BASOPHILS # BLD AUTO: 0 K/UL (ref 0–0.2)
BASOPHILS NFR BLD AUTO: 0 %
BUN SERPL-SCNC: 12 MG/DL (ref 7–17)
CALCIUM SPEC-MCNC: 9.9 MG/DL (ref 8.4–10.2)
CHLORIDE SERPL-SCNC: 106 MMOL/L (ref 98–107)
CO2 SERPL-SCNC: 21 MMOL/L (ref 22–30)
EOSINOPHIL # BLD AUTO: 0.1 K/UL (ref 0–0.7)
EOSINOPHIL NFR BLD AUTO: 1 %
ERYTHROCYTE [DISTWIDTH] IN BLOOD BY AUTOMATED COUNT: 4.51 M/UL (ref 3.8–5.4)
ERYTHROCYTE [DISTWIDTH] IN BLOOD: 13.2 % (ref 11.5–15.5)
GLUCOSE SERPL-MCNC: 94 MG/DL (ref 74–99)
HCT VFR BLD AUTO: 43.9 % (ref 34–46)
HGB BLD-MCNC: 14.3 GM/DL (ref 11.4–16)
INR PPP: 0.9 (ref ?–1.2)
LYMPHOCYTES # SPEC AUTO: 0.2 K/UL (ref 1–4.8)
LYMPHOCYTES NFR SPEC AUTO: 2 %
MCH RBC QN AUTO: 31.8 PG (ref 25–35)
MCHC RBC AUTO-ENTMCNC: 32.6 G/DL (ref 31–37)
MCV RBC AUTO: 97.4 FL (ref 80–100)
MONOCYTES # BLD AUTO: 0.2 K/UL (ref 0–1)
MONOCYTES NFR BLD AUTO: 2 %
NEUTROPHILS # BLD AUTO: 12.7 K/UL (ref 1.3–7.7)
NEUTROPHILS NFR BLD AUTO: 96 %
PH UR: 6.5 [PH] (ref 5–8)
PLATELET # BLD AUTO: 268 K/UL (ref 150–450)
POTASSIUM SERPL-SCNC: 4.2 MMOL/L (ref 3.5–5.1)
PROT SERPL-MCNC: 7.1 G/DL (ref 6.3–8.2)
PT BLD: 9.7 SEC (ref 9–12)
RBC UR QL: 32 /HPF (ref 0–5)
SODIUM SERPL-SCNC: 141 MMOL/L (ref 137–145)
SP GR UR: 1.01 (ref 1–1.03)
SQUAMOUS UR QL AUTO: 1 /HPF (ref 0–4)
UROBILINOGEN UR QL STRIP: <2 MG/DL (ref ?–2)
WBC # BLD AUTO: 13.2 K/UL (ref 3.8–10.6)
WBC #/AREA URNS HPF: 1 /HPF (ref 0–5)

## 2018-12-15 PROCEDURE — 80053 COMPREHEN METABOLIC PANEL: CPT

## 2018-12-15 PROCEDURE — 87086 URINE CULTURE/COLONY COUNT: CPT

## 2018-12-15 PROCEDURE — 83605 ASSAY OF LACTIC ACID: CPT

## 2018-12-15 PROCEDURE — 96375 TX/PRO/DX INJ NEW DRUG ADDON: CPT

## 2018-12-15 PROCEDURE — 96372 THER/PROPH/DIAG INJ SC/IM: CPT

## 2018-12-15 PROCEDURE — 71045 X-RAY EXAM CHEST 1 VIEW: CPT

## 2018-12-15 PROCEDURE — 93005 ELECTROCARDIOGRAM TRACING: CPT

## 2018-12-15 PROCEDURE — 71046 X-RAY EXAM CHEST 2 VIEWS: CPT

## 2018-12-15 PROCEDURE — 83735 ASSAY OF MAGNESIUM: CPT

## 2018-12-15 PROCEDURE — 87077 CULTURE AEROBIC IDENTIFY: CPT

## 2018-12-15 PROCEDURE — 99285 EMERGENCY DEPT VISIT HI MDM: CPT

## 2018-12-15 PROCEDURE — 96368 THER/DIAG CONCURRENT INF: CPT

## 2018-12-15 PROCEDURE — 85025 COMPLETE CBC W/AUTO DIFF WBC: CPT

## 2018-12-15 PROCEDURE — 96361 HYDRATE IV INFUSION ADD-ON: CPT

## 2018-12-15 PROCEDURE — 36415 COLL VENOUS BLD VENIPUNCTURE: CPT

## 2018-12-15 PROCEDURE — 87040 BLOOD CULTURE FOR BACTERIA: CPT

## 2018-12-15 PROCEDURE — 94760 N-INVAS EAR/PLS OXIMETRY 1: CPT

## 2018-12-15 PROCEDURE — 87186 SC STD MICRODIL/AGAR DIL: CPT

## 2018-12-15 PROCEDURE — 96365 THER/PROPH/DIAG IV INF INIT: CPT

## 2018-12-15 PROCEDURE — 85610 PROTHROMBIN TIME: CPT

## 2018-12-15 PROCEDURE — 96366 THER/PROPH/DIAG IV INF ADDON: CPT

## 2018-12-15 PROCEDURE — 87150 DNA/RNA AMPLIFIED PROBE: CPT

## 2018-12-15 PROCEDURE — 85730 THROMBOPLASTIN TIME PARTIAL: CPT

## 2018-12-15 PROCEDURE — 80048 BASIC METABOLIC PNL TOTAL CA: CPT

## 2018-12-15 PROCEDURE — 81001 URINALYSIS AUTO W/SCOPE: CPT

## 2018-12-15 PROCEDURE — 87101 SKIN FUNGI CULTURE: CPT

## 2018-12-15 RX ADMIN — CEFAZOLIN SCH MLS/HR: 330 INJECTION, POWDER, FOR SOLUTION INTRAMUSCULAR; INTRAVENOUS at 19:35

## 2018-12-15 RX ADMIN — NICARDIPINE HYDROCHLORIDE SCH MLS/HR: 2.5 INJECTION INTRAVENOUS at 16:57

## 2018-12-15 RX ADMIN — NICARDIPINE HYDROCHLORIDE SCH MLS/HR: 2.5 INJECTION INTRAVENOUS at 17:57

## 2018-12-15 NOTE — ED
General Adult HPI





- General


Chief complaint: Fever


Stated complaint: Leg pain


Time Seen by Provider: 12/15/18 16:35


Source: patient, RN notes reviewed, old records reviewed


Mode of arrival: ambulatory


Limitations: no limitations





- History of Present Illness


Initial comments: 





Chief complaint and history of present illness is a 38-year-old female presents 

emergency room with acute onset of painful cellulitis to the right lower 

extremity.  Patient reports this to be the seventh time she's had this happen.  

She's been admitted in the past and treated on Cefzil.  Patient presents with a 

fever of 101.3.  She was given IV fluids sepsis workup started immediately.  

Review of past admissions finds the patient probable strep secondary to fungal 

infections which are interdigital to her feet.





- Related Data


 Home Medications











 Medication  Instructions  Recorded  Confirmed


 


Ibuprofen [Motrin Ib] 200 mg PO Q6H PRN 18








 Previous Rx's











 Medication  Instructions  Recorded


 


Cephalexin [Keflex] 500 mg PO Q8HR #21 cap 18


 


Fluconazole 200 mg PO DAILY #5 tab 18











 Allergies











Allergy/AdvReac Type Severity Reaction Status Date / Time


 


No Known Allergies Allergy   Verified 12/15/18 16:08














Review of Systems


ROS Statement: 


Those systems with pertinent positive or pertinent negative responses have been 

documented in the HPI.


Review of systems.  The patient has chronic migraine type headaches.  Denies 

any stiff neck.


Chest pain or shortness of breath.  Has discomfort and patches of redness which 

are warm tender and hot on her right lower extremity.  She reports is typical 

of the cellulitis episode she's had multiple times in the past.  No nausea no 

vomiting no diarrhea.  No neuro deficits.  All systems reviewed.  Past medical 

problems significant for cervical cancer, skin disorder in the form of repeated 

cellulitis mostly starting on her right leg.  Thought to be caused by an 

infection that starts in the web of her feet with chronic skin breakdown and 

fungal infections.  Patient also has chronic migraines.  The patient's past 

surgical history  and LEEP procedure.  Family history noncontributory.

  Patient has no ALLERGIES.  Continues to smoke encouraged to stop denies 

alcohol use.








ROS Other: All systems not noted in ROS Statement are negative.





Past Medical History


Past Medical History: Cancer, Skin Disorder


Additional Past Medical History / Comment(s): migraines, hoarse voice and diff 

swallowing-recent choking, dry skin on foot, hx cellulitis, anemia, hx cervical 

cancer


History of Any Multi-Drug Resistant Organisms: None Reported


Past Surgical History:  Section, Orthopedic Surgery


Additional Past Surgical History / Comment(s): LEEP procedure, left knee 

arthroscopy, oral surgery


Past Anesthesia/Blood Transfusion Reactions: Previous Problems w/ Anesthesia


Additional Past Anesthesia/Blood Transfusion Reaction / Comment(s): spinal 

anesthesia for C/S-"stopped breathing/paralyzed lungs"


Past Psychological History: No Psychological Hx Reported


Smoking Status: Current some day smoker


Past Alcohol Use History: None Reported


Past Drug Use History: None Reported





- Past Family History


  ** Father


Family Medical History: Diabetes Mellitus, Hypertension





  ** Mother


Family Medical History: No Reported History





General Exam





- General Exam Comments


Initial Comments: 





General:


The patient is awake and alert, complains of fever that started today with 

aches and pains to her right lower extremity.  Vital signs shows temperature 

101.3 pulse 138 over story rate 20 pulse ox on 2 L.  Blood pressure 128/81.


Eye:


Pupils are equal, round and reactive to light, extra-ocular movements are intact

; there is normal conjunctiva bilaterally. No signs of icterus. 


Ears, nose, mouth and throat:


There are moist mucous membranes and no oral lesions. 


Neck:


The neck is supple, there is no tenderness no anterior cervical lymphadenopathy

, thyroid not enlarged.


Cardiovascular:


Tachycardic heart rate, no murmur..


Respiratory:


Lungs are clear to auscultation, respirations are non-labored, breath sounds 

are equal. No wheezes, stridor, rales, or rhonchi.


Gastrointestinal:


Soft, non-distended, non-tender abdomen without masses or organomegaly noted. 

There is no rebound or guarding present. No CVA tenderness. Bowel sounds are 

unremarkable.


Back:


There is no tenderness to palpation in the midline. There is no obvious 

deformity. No rashes noted. 


Musculoskeletal: Interdigital breakdown of skin between the toes on the right 

foot.  Chronic fungal infection.  Mild tenderness to the right inguinal area.


Patches, palm-sized of tender, hot mildly edematous areas of cellulitis on the 

dorsal surface of the right foot on the anterior surface of her right lower 

leg.  And complains discomfort in the right groin area.


Neurological:


No neuro deficits


Skin:


Cellulitis as described above


Psychiatric:


Cooperative, 





Limitations: no limitations





Course


 Vital Signs











  12/15/18 12/15/18





  16:06 18:00


 


Temperature 101.3 F H 104.5 F H


 


Pulse Rate 138 H 133 H


 


Respiratory 20 22





Rate  


 


Blood Pressure 128/81 112/65


 


O2 Sat by Pulse 100 97





Oximetry  














Medical Decision Making





- Medical Decision Making





Medical decision making; is a 38-year-old female who has had recurrent 

cellulitic infections in her right leg multiple times with the past several 

years.  The patient was evaluated by infectious disease on a previous 

admission.  She was treated with Cefzil with good results.  It appears as 

though the infection normally starts from interdigital toe infections initially 

fungal in nature.  Today's patient presents with again breakdown of skin 

between the toes.  With areas of hot tender mildly edematous areas of 

cellulitis around her right leg with right groin discomfort.  Fever of 101.





Patient was started on IV fluids and IV antibiotics after the patient had her 

labs and blood cultures drawn.





The patient's white count is 13 hemoglobin 14 hematocrit of 42 with potassium 

4.2.  Urine shows 32 reds.  Patient is not complaining of any emergency urgency 

or dysuria and no flank pain at this time.  The patient BUN is 12 creatinine 

0.84 the GFR 88.  Plasma lactic acid elevated at 4.8.  The patient receiving 2 

L of IV fluid.  She received by mouth Tylenol for fever.  Within the hour the 

patient reports she is feeling better.  Again the patient was started on IV 

antibiotics. 





 Patient's feeling better.  Case discussed with Attila.  Patient will be 

admitted to Dr. Rdz.  With consultation from infectious disease, Dr. Pena.





- Lab Data


Result diagrams: 


 12/15/18 16:32





 12/15/18 16:32


 Lab Results











  12/15/18 12/15/18 12/15/18 Range/Units





  16:12 16:32 16:32 


 


WBC   13.2 H   (3.8-10.6)  k/uL


 


RBC   4.51   (3.80-5.40)  m/uL


 


Hgb   14.3   (11.4-16.0)  gm/dL


 


Hct   43.9   (34.0-46.0)  %


 


MCV   97.4   (80.0-100.0)  fL


 


MCH   31.8   (25.0-35.0)  pg


 


MCHC   32.6   (31.0-37.0)  g/dL


 


RDW   13.2   (11.5-15.5)  %


 


Plt Count   268   (150-450)  k/uL


 


Neutrophils %   96   %


 


Lymphocytes %   2   %


 


Monocytes %   2   %


 


Eosinophils %   1   %


 


Basophils %   0   %


 


Neutrophils #   12.7 H   (1.3-7.7)  k/uL


 


Lymphocytes #   0.2 L   (1.0-4.8)  k/uL


 


Monocytes #   0.2   (0-1.0)  k/uL


 


Eosinophils #   0.1   (0-0.7)  k/uL


 


Basophils #   0.0   (0-0.2)  k/uL


 


PT     (9.0-12.0)  sec


 


INR     (<1.2)  


 


APTT     (22.0-30.0)  sec


 


Sodium    141  (137-145)  mmol/L


 


Potassium    4.2  (3.5-5.1)  mmol/L


 


Chloride    106  ()  mmol/L


 


Carbon Dioxide    21 L  (22-30)  mmol/L


 


Anion Gap    14  mmol/L


 


BUN    12  (7-17)  mg/dL


 


Creatinine    0.84  (0.52-1.04)  mg/dL


 


Est GFR (CKD-EPI)AfAm    >90  (>60 ml/min/1.73 sqM)  


 


Est GFR (CKD-EPI)NonAf    88  (>60 ml/min/1.73 sqM)  


 


Glucose    94  (74-99)  mg/dL


 


Plasma Lactic Acid Antonio     (0.7-2.0)  mmol/L


 


Calcium    9.9  (8.4-10.2)  mg/dL


 


Total Bilirubin    0.6  (0.2-1.3)  mg/dL


 


AST    30  (14-36)  U/L


 


ALT    25  (9-52)  U/L


 


Alkaline Phosphatase    67  ()  U/L


 


Total Protein    7.1  (6.3-8.2)  g/dL


 


Albumin    4.2  (3.5-5.0)  g/dL


 


Urine Color  Yellow    


 


Urine Appearance  Clear    (Clear)  


 


Urine pH  6.5    (5.0-8.0)  


 


Ur Specific Gravity  1.013    (1.001-1.035)  


 


Urine Protein  Negative    (Negative)  


 


Urine Glucose (UA)  Negative    (Negative)  


 


Urine Ketones  Negative    (Negative)  


 


Urine Blood  Moderate H    (Negative)  


 


Urine Nitrite  Negative    (Negative)  


 


Urine Bilirubin  Negative    (Negative)  


 


Urine Urobilinogen  <2.0    (<2.0)  mg/dL


 


Ur Leukocyte Esterase  Negative    (Negative)  


 


Urine RBC  32 H    (0-5)  /hpf


 


Urine WBC  1    (0-5)  /hpf


 


Ur Squamous Epith Cells  1    (0-4)  /hpf


 


Urine Mucus  Rare H    (None)  /hpf














  12/15/18 12/15/18 Range/Units





  16:32 16:32 


 


WBC    (3.8-10.6)  k/uL


 


RBC    (3.80-5.40)  m/uL


 


Hgb    (11.4-16.0)  gm/dL


 


Hct    (34.0-46.0)  %


 


MCV    (80.0-100.0)  fL


 


MCH    (25.0-35.0)  pg


 


MCHC    (31.0-37.0)  g/dL


 


RDW    (11.5-15.5)  %


 


Plt Count    (150-450)  k/uL


 


Neutrophils %    %


 


Lymphocytes %    %


 


Monocytes %    %


 


Eosinophils %    %


 


Basophils %    %


 


Neutrophils #    (1.3-7.7)  k/uL


 


Lymphocytes #    (1.0-4.8)  k/uL


 


Monocytes #    (0-1.0)  k/uL


 


Eosinophils #    (0-0.7)  k/uL


 


Basophils #    (0-0.2)  k/uL


 


PT   9.7  (9.0-12.0)  sec


 


INR   0.9  (<1.2)  


 


APTT   19.0 L  (22.0-30.0)  sec


 


Sodium    (137-145)  mmol/L


 


Potassium    (3.5-5.1)  mmol/L


 


Chloride    ()  mmol/L


 


Carbon Dioxide    (22-30)  mmol/L


 


Anion Gap    mmol/L


 


BUN    (7-17)  mg/dL


 


Creatinine    (0.52-1.04)  mg/dL


 


Est GFR (CKD-EPI)AfAm    (>60 ml/min/1.73 sqM)  


 


Est GFR (CKD-EPI)NonAf    (>60 ml/min/1.73 sqM)  


 


Glucose    (74-99)  mg/dL


 


Plasma Lactic Acid Antonio  4.8 H*   (0.7-2.0)  mmol/L


 


Calcium    (8.4-10.2)  mg/dL


 


Total Bilirubin    (0.2-1.3)  mg/dL


 


AST    (14-36)  U/L


 


ALT    (9-52)  U/L


 


Alkaline Phosphatase    ()  U/L


 


Total Protein    (6.3-8.2)  g/dL


 


Albumin    (3.5-5.0)  g/dL


 


Urine Color    


 


Urine Appearance    (Clear)  


 


Urine pH    (5.0-8.0)  


 


Ur Specific Gravity    (1.001-1.035)  


 


Urine Protein    (Negative)  


 


Urine Glucose (UA)    (Negative)  


 


Urine Ketones    (Negative)  


 


Urine Blood    (Negative)  


 


Urine Nitrite    (Negative)  


 


Urine Bilirubin    (Negative)  


 


Urine Urobilinogen    (<2.0)  mg/dL


 


Ur Leukocyte Esterase    (Negative)  


 


Urine RBC    (0-5)  /hpf


 


Urine WBC    (0-5)  /hpf


 


Ur Squamous Epith Cells    (0-4)  /hpf


 


Urine Mucus    (None)  /hpf














Disposition


Clinical Impression: 


 Cellulitis of right leg





Disposition: ADMITTED AS IP TO THIS HOSP


Condition: Serious


Is patient prescribed a controlled substance at d/c from ED?: No


Referrals: 


Castro Mendez MD [Primary Care Provider] - 1-2 days

## 2018-12-15 NOTE — XR
EXAMINATION TYPE: XR chest 2V

 

DATE OF EXAM: 12/15/2018

 

COMPARISON: 7/17/2014

 

HISTORY: Chest pain

 

TECHNIQUE:  Frontal and lateral views of the chest are obtained.

 

FINDINGS:  Heart and mediastinum are normal. Lungs are clear. Diaphragm is normal. Bony thorax appear
s normal. There are chest leads.

 

IMPRESSION:  Normal chest. No change.

## 2018-12-16 LAB
ALBUMIN SERPL-MCNC: 2.3 G/DL (ref 3.5–5)
ALP SERPL-CCNC: 34 U/L (ref 38–126)
ALT SERPL-CCNC: 26 U/L (ref 9–52)
ANION GAP SERPL CALC-SCNC: 3 MMOL/L
AST SERPL-CCNC: 16 U/L (ref 14–36)
BASOPHILS # BLD AUTO: 0 K/UL (ref 0–0.2)
BASOPHILS NFR BLD AUTO: 0 %
BUN SERPL-SCNC: 12 MG/DL (ref 7–17)
CALCIUM SPEC-MCNC: 7 MG/DL (ref 8.4–10.2)
CHLORIDE SERPL-SCNC: 116 MMOL/L (ref 98–107)
CO2 SERPL-SCNC: 20 MMOL/L (ref 22–30)
EOSINOPHIL # BLD AUTO: 0 K/UL (ref 0–0.7)
EOSINOPHIL NFR BLD AUTO: 0 %
ERYTHROCYTE [DISTWIDTH] IN BLOOD BY AUTOMATED COUNT: 3.51 M/UL (ref 3.8–5.4)
ERYTHROCYTE [DISTWIDTH] IN BLOOD: 13.6 % (ref 11.5–15.5)
GLUCOSE BLD-MCNC: 109 MG/DL (ref 75–99)
GLUCOSE SERPL-MCNC: 91 MG/DL (ref 74–99)
HCT VFR BLD AUTO: 34.7 % (ref 34–46)
HGB BLD-MCNC: 11.2 GM/DL (ref 11.4–16)
LYMPHOCYTES # SPEC AUTO: 0.1 K/UL (ref 1–4.8)
LYMPHOCYTES NFR SPEC AUTO: 1 %
MCH RBC QN AUTO: 31.8 PG (ref 25–35)
MCHC RBC AUTO-ENTMCNC: 32.2 G/DL (ref 31–37)
MCV RBC AUTO: 98.9 FL (ref 80–100)
MONOCYTES # BLD AUTO: 0.1 K/UL (ref 0–1)
MONOCYTES NFR BLD AUTO: 1 %
NEUTROPHILS # BLD AUTO: 11.6 K/UL (ref 1.3–7.7)
NEUTROPHILS NFR BLD AUTO: 98 %
PLATELET # BLD AUTO: 256 K/UL (ref 150–450)
POTASSIUM SERPL-SCNC: 3.9 MMOL/L (ref 3.5–5.1)
PROT SERPL-MCNC: 4.5 G/DL (ref 6.3–8.2)
SODIUM SERPL-SCNC: 139 MMOL/L (ref 137–145)
WBC # BLD AUTO: 11.9 K/UL (ref 3.8–10.6)

## 2018-12-16 RX ADMIN — ACETAMINOPHEN PRN MG: 325 TABLET, FILM COATED ORAL at 20:06

## 2018-12-16 RX ADMIN — CEFAZOLIN SCH MLS/HR: 330 INJECTION, POWDER, FOR SOLUTION INTRAMUSCULAR; INTRAVENOUS at 05:54

## 2018-12-16 RX ADMIN — IBUPROFEN PRN MG: 400 TABLET, FILM COATED ORAL at 09:41

## 2018-12-16 RX ADMIN — CEFAZOLIN SCH MLS/HR: 330 INJECTION, POWDER, FOR SOLUTION INTRAMUSCULAR; INTRAVENOUS at 20:08

## 2018-12-16 RX ADMIN — IBUPROFEN PRN MG: 400 TABLET, FILM COATED ORAL at 20:07

## 2018-12-16 RX ADMIN — CLOTRIMAZOLE SCH APPLIC: 0.01 CREAM TOPICAL at 20:08

## 2018-12-16 RX ADMIN — MAGNESIUM SULFATE IN DEXTROSE SCH MLS/HR: 10 INJECTION, SOLUTION INTRAVENOUS at 20:50

## 2018-12-16 RX ADMIN — CEFAZOLIN SCH: 330 INJECTION, POWDER, FOR SOLUTION INTRAMUSCULAR; INTRAVENOUS at 17:49

## 2018-12-16 RX ADMIN — MAGNESIUM SULFATE IN DEXTROSE SCH MLS/HR: 10 INJECTION, SOLUTION INTRAVENOUS at 23:27

## 2018-12-16 RX ADMIN — HEPARIN SODIUM SCH UNIT: 5000 INJECTION, SOLUTION INTRAVENOUS; SUBCUTANEOUS at 08:52

## 2018-12-16 RX ADMIN — MAGNESIUM SULFATE IN DEXTROSE SCH MLS/HR: 10 INJECTION, SOLUTION INTRAVENOUS at 21:46

## 2018-12-16 RX ADMIN — FLUCONAZOLE SCH MG: 100 TABLET ORAL at 08:43

## 2018-12-16 RX ADMIN — HEPARIN SODIUM SCH UNIT: 5000 INJECTION, SOLUTION INTRAVENOUS; SUBCUTANEOUS at 20:50

## 2018-12-16 RX ADMIN — HYDROMORPHONE HYDROCHLORIDE PRN MG: 1 INJECTION, SOLUTION INTRAMUSCULAR; INTRAVENOUS; SUBCUTANEOUS at 21:25

## 2018-12-16 RX ADMIN — SODIUM CHLORIDE SCH MLS/HR: 900 INJECTION, SOLUTION INTRAVENOUS at 10:29

## 2018-12-16 RX ADMIN — PANTOPRAZOLE SODIUM SCH MG: 40 INJECTION, POWDER, FOR SOLUTION INTRAVENOUS at 08:52

## 2018-12-16 RX ADMIN — ACETAMINOPHEN PRN MG: 325 TABLET, FILM COATED ORAL at 08:42

## 2018-12-16 NOTE — P.CONS
History of Present Illness





- Reason for Consult


Consult date: 18





- Chief Complaint


fever





- History of Present Illness





38-year-old presents to Hospital with the sudden onset of pain and swelling and 

discomfort to the right lower extremity.  Is a known history of a 

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 

has been cared for by dermatology and podiatry in the past.  Despite the care 

she continues to have difficulty does have multiple hospitalizations regarding 

cellulitis of the right lower extremity.  Her last auscultation was much more 

brief and she was not nearly as ill as she has been in the past.





Apparently a few hours before she came to the emergency center she started 

feeling poorly with evidence of swelling and pain to the right leg.  When she 

started difficulties with ambulation she sought care in the emergency center 

where she was found to have a temperature of 104, she was with relative 

hypotension and evidence of acute sepsis.  He has in the past she had an 

extensive cellulitis right lower extremity with an ascending lymphangitis with 

tenderness to the right inguinal node.  She was with relative hypotension 

requiring fluid resuscitation.  With resuscitation and antipyretic she's 

feeling a bit better today leg is still swollen and quite painful.  She has 

significant lactic acidosis of 4.8 now improved to 1.1.  Antibiotic therapy was 

initiated the cough and emergency center with cefepime and vancomycin pending 

culture results.





Review of Systems





Patient still feels quite poorly


HEENT:Denies headache or acute visual change.  Denies sinus or mouth 

discomforts.  Denies neck stiffness or pain.  Denies significant oral cavity 

pain.  Denies difficulty on swallowing.





Lungs: Denies significant shortness of breath, cough, sputum production, or 

hemoptysis.





Cardiovascular: Denies significant shortness of breath, chest pain, chest wall 

pain, 


orthopnea, dyspnea on exertion, syncope





Gastrointestinal:Denies nausea, vomiting, diarrhea, constipation, hematemesis, 

melena, hematochezia.  No no significant change of bowel habit noticed.





Musculoskeletal: denies significant myalgias or arthralgias.  No new joint 

swelling.  Denies new back pain.





Skin: Significant pain swelling redness to the right lower extremity with the 

chronic changes to the interspaces of the toes of the right foot 





Neuro: Denies headache or visual change.  Denies any new onset weakness or 

difficulty with ambulation.  Denies falls or seizures.





Psychiatric:Denies anxiety or depression.





Endocrine: Denies significant fatigue, denies significant weight loss or weight 

gain.

















Past Medical History


Past Medical History: Cancer, Skin Disorder


Additional Past Medical History / Comment(s): migraines, hoarse voice and diff 

swallowing-recent choking, dry skin on foot, hx cellulitis, anemia, hx cervical 

cancer


History of Any Multi-Drug Resistant Organisms: None Reported


Past Surgical History:  Section, Orthopedic Surgery


Additional Past Surgical History / Comment(s): LEEP procedure, left knee 

arthroscopy, oral surgery


Past Anesthesia/Blood Transfusion Reactions: Previous Problems w/ Anesthesia


Additional Past Anesthesia/Blood Transfusion Reaction / Comm: spinal anesthesia 

for C/S-"stopped breathing/paralyzed lungs"


Past Psychological History: No Psychological Hx Reported


Additional Psychological History / Comment(s): She has been a smoker on and off 

for 20 years.  She denies any medical marijuana, marijuana, street drug use.  

She denies any alcohol use.  She lives at home with family members and her 

children.  There are no pets in the home.  She has had no recent travel.  Past 

Drug Use History: None Reported


Smoking Status: Current some day smoker


Past Alcohol Use History: None Reported


Additional Past Alcohol Use History / Comment(s): occ smoking


Past Drug Use History: None Reported





- Past Family History


  ** Father


Family Medical History: Diabetes Mellitus, Hypertension





  ** Mother


Family Medical History: No Reported History





Medications and Allergies


Home Medications and Allergies Comment(s): 





 Current Medications





Acetaminophen (Tylenol Tab)  650 mg PO Q6HR PRN


   PRN Reason: Mild Pain or Fever > 100.5


   Last Admin: 18 20:06 Dose:  650 mg


Clotrimazole (Lotrimin Cream)  1 applic TOPICAL BID [facility]


   Last Admin: 18 20:08 Dose:  1 applic


Fluconazole (Diflucan)  200 mg PO DAILY [facility]


   Last Admin: 18 08:43 Dose:  200 mg


Heparin Sodium (Porcine) (Heparin)  5,000 unit SQ Q12HR [facility]


   Last Admin: 18 20:50 Dose:  5,000 unit


Hydromorphone HCl (Dilaudid)  0.5 mg IVP Q4HR PRN


   PRN Reason: Pain


   Last Admin: 18 21:25 Dose:  0.5 mg


Sodium Chloride (Saline 0.9%)  1,000 mls @ 100 mls/hr IV .Q10H [facility]


   Last Admin: 18 20:08 Dose:  100 mls/hr


Ceftriaxone Sodium 2,000 mg/ (Sodium Chloride)  100 mls @ 100 mls/hr IVPB Q24HR 

[facility]


   Last Admin: 18 10:29 Dose:  100 mls/hr


Magnesium Sulfate/Dextrose 1 (gm/ IV Solution)  100 mls @ 100 mls/hr IVPB Q1H 

[facility]


   Stop: 18 00:29


   Last Admin: 18 20:50 Dose:  100 mls/hr


Ibuprofen (Motrin)  400 mg PO Q6HR PRN


   PRN Reason: Mild Pain or Fever > 100.5


   Last Admin: 18 20:07 Dose:  400 mg


Miscellaneous Information (Potassium Per Protocol)  1 each MISCELLANE DAILY PRN

; Protocol


   PRN Reason: Per Protocol


Miscellaneous Information (Potassium Per Protocol)  1 each MISCELLANE DAILY PRN

; Protocol


   PRN Reason: Per Protocol


Miscellaneous Information (Magnesium Per Protocol)  1 each MISCELLANE DAILY PRN

; Protocol


   PRN Reason: Per Protocol


Naloxone HCl (Narcan)  0.2 mg IV Q2M PRN


   PRN Reason: Opioid Reversal


Pantoprazole Sodium (Protonix)  40 mg IV DAILY [facility]


   Last Admin: 18 08:52 Dose:  40 mg


Tramadol HCl (Ultram)  50 mg PO QID PRN


   PRN Reason: MODERATE Pain








 Home Medications











 Medication  Instructions  Recorded  Confirmed  Type


 


No Known Home Medications  18 History











 Allergies











Allergy/AdvReac Type Severity Reaction Status Date / Time


 


No Known Allergies Allergy   Verified 18 08:58














Physical Exam


Vitals: 


 Vital Signs











  Temp Pulse Resp BP Pulse Ox


 


 18 19:00   105 H  25 H  115/65  98


 


 18 18:00   98  4 L  117/64  92 L


 


 18 17:00   96  22  114/61  94 L


 


 18 16:00   90  12  114/65  97


 


 18 15:00   93  28 H  112/67  97


 


 18 14:00   96  24  104/61  96


 


 18 13:00   96  23  109/64  95


 


 18 12:00   93  20  113/60  93 L


 


 18 11:00   102 H  18  105/63  96


 


 18 10:00   96  23  105/63  95


 


 18 09:00   98  14  99/62  98


 


 18 08:00  99.3 F  96  24  97/62  98


 


 18 07:00   97  13  95/64  97


 


 18 06:00   91  14  85/52  98


 


 18 05:00   91  12  90/54  98


 


 18 04:00  98.7 F  91  14  92/59  96


 


 18 03:00   97  17  88/53  96


 


 18 02:00   98  17  96/61  98


 


 18 01:00   102 H  15  88/60  97


 


 18 00:30  98.1 F   14  


 


 18 00:26  99.5 F  103 H  18  96/54  98


 


 18 00:10   108 H  18  100/53  98


 


 18 00:00  98.1 F  107 H  27 H  90/69  98


 


 12/15/18 23:41   108 H  18  99/52  98


 


 12/15/18 23:36  99.4 F  107 H  18  99/50  98


 


 12/15/18 23:33   106 H  27 H  145/101 


 


 12/15/18 23:31  99.4 F  105 H  18  


 


 12/15/18 23:30   105 H  10 L  77/62  98


 


 12/15/18 23:25   106 H  11 L  87/40 


 


 12/15/18 23:20   108 H  21  72/33 


 


 12/15/18 23:15   106 H  10 L  82/50  98


 


 12/15/18 23:10   105 H  8 L  79/45  98


 


 12/15/18 23:05   109 H  12  82/40 


 


 12/15/18 23:00   103 H  12  80/37  98


 


 12/15/18 22:57  100.1 F H  102 H  18  80/37  98


 


 12/15/18 22:55   105 H  13  96/46 


 


 12/15/18 22:50   107 H  22  89/47 


 


 12/15/18 22:45   107 H  18  88/47 


 


 12/15/18 22:40   107 H  16  92/47  97


 


 12/15/18 22:35   107 H  16  96/50  97


 


 12/15/18 22:30   106 H  18  88/45  97


 


 12/15/18 22:25   112 H  20  93/48 


 


 12/15/18 22:20   112 H  17  90/45 


 


 12/15/18 22:15   112 H  16  95/49 


 


 12/15/18 22:10   110 H  19  96/50 


 


 12/15/18 22:05   111 H  22  102/60 


 


 12/15/18 22:00   116 H  33 H  88/40  96


 


 12/15/18 21:55   113 H  21  97/49 


 


 12/15/18 21:50   110 H  18  93/50 








 Intake and Output











 18





 06:59 14:59 22:59


 


Intake Total 5100 1250 500


 


Output Total 125 700 100


 


Balance 4975 550 400


 


Intake:   


 


  IV 5100 800 500


 


    Sodium Chloride 0.9% 1, 5100 800 500





    000 ml @ 100 mls/hr IV .   





    Q10H [facility] Rx#:[id number]   


 


  Intake, IV Titration  350 





  Amount   


 


    Vancomycin 1,250 mg In  250 





    Sodium Chloride 0.9% 250   





    ml @ 125 mls/hr IVPB Q12H   





    [facility] Rx#:[id number]   


 


    cefTRIAXone 2,000 mg In  100 





    Sodium Chloride 0.9% 100   





    ml @ 100 mls/hr IVPB   





    Q24HR [facility] Rx#:[id number]   


 


  Oral  100 


 


Output:   


 


  Urine 125 700 100


 


Other:   


 


  # Voids 0 0 1


 


  # Bowel Movements   1


 


  Weight 67 kg  














Pleasant 38-year-old woman still very uncomfortable due to the pain and 

swelling of her right leg.  Fever has resolved hypotension improved


HEENT: Anicteric conjunctiva are pink and moist nasal mucosa grossly intact 

without significant lesions, there is no thrush.


Neck: The neck is supple without significant lymphadenopathy or thyromegaly.


Lungs: Good bilateral air entry without significant crackles or wheezing.  

There is no significant bronchial sounds.  There is no egophony or dullness.


Heart: Regular rate and rhythm with an audible S1-S2, no S3 no S4.  There is no 

significant murmur click or rub, PMI was nondisplaced.


Abdomen: Positive bowel sounds soft and nontender without palpable masses or 

organomegaly.  There was no guarding or rebound.


Extremities: The upper extremities have excellent pulses they are symmetric, no 

significant petechiae or telangiectasia.  No splinter hemorrhages were noted.  


The left lower extremity shows no acute abnormalities or significant edema.  

Right lower extremities shows evidence of the extensive erythema and edema that 

goes from the foot all the way to the fold of the thigh.  There is swelling in 

significant discomfort to the right inguinal lymph nodes.  No open ulcerations 

or blisters are seen just a dense erythema that is quite tender.  The 

interspaces show evidence of the thickened white material but does appear to be 

fungal in nature.


Neuro: Awake alert oriented to person place and time.  There are no acute new 

gross focal sensory motor deficits.








Results


Results: 





 Laboratory Results











WBC  11.9 k/uL (3.8-10.6)  H  18  04:25    


 


RBC  3.51 m/uL (3.80-5.40)  L  18  04:25    


 


Hgb  11.2 gm/dL (11.4-16.0)  L D 18  04:25    


 


Hct  34.7 % (34.0-46.0)   18  04:25    


 


MCV  98.9 fL (80.0-100.0)   18  04:25    


 


MCH  31.8 pg (25.0-35.0)   18  04:25    


 


MCHC  32.2 g/dL (31.0-37.0)   18  04:25    


 


RDW  13.6 % (11.5-15.5)   18  04:25    


 


Plt Count  256 k/uL (150-450)   18  04:25    


 


Neutrophils %  98 %  18  04:25    


 


Lymphocytes %  1 %  18  04:25    


 


Monocytes %  1 %  18  04:25    


 


Eosinophils %  0 %  18  04:25    


 


Basophils %  0 %  18  04:25    


 


Neutrophils #  11.6 k/uL (1.3-7.7)  H  18  04:25    


 


Lymphocytes #  0.1 k/uL (1.0-4.8)  L  18  04:25    


 


Monocytes #  0.1 k/uL (0-1.0)   18  04:25    


 


Eosinophils #  0.0 k/uL (0-0.7)   18  04:25    


 


Basophils #  0.0 k/uL (0-0.2)   18  04:25    


 


PT  9.7 sec (9.0-12.0)   12/15/18  16:32    


 


INR  0.9  (<1.2)   12/15/18  16:32    


 


APTT  19.0 sec (22.0-30.0)  L  12/15/18  16:32    


 


Sodium  139 mmol/L (137-145)   18  04:25    


 


Potassium  3.9 mmol/L (3.5-5.1)   18  04:25    


 


Chloride  116 mmol/L ()  H  18  04:25    


 


Carbon Dioxide  20 mmol/L (22-30)  L  18  04:25    


 


Anion Gap  3 mmol/L  18  04:25    


 


BUN  12 mg/dL (7-17)   18  04:25    


 


Creatinine  0.73 mg/dL (0.52-1.04)   18  04:25    


 


Est GFR (CKD-EPI)AfAm  >90  (>60 ml/min/1.73 sqM)   18  04:25    


 


Est GFR (CKD-EPI)NonAf  >90  (>60 ml/min/1.73 sqM)   18  04:25    


 


Glucose  91 mg/dL (74-99)   18  04:25    


 


POC Glucose (mg/dL)  109 mg/dL (75-99)  H  18  00:38    


 


POC Glu Operater ID  Dank Nelson   18  00:38    


 


Lactic Ac Sepsis Rflx  Y   12/15/18  17:49    


 


Plasma Lactic Acid Antonio  1.1 mmol/L (0.7-2.0)   12/15/18  21:35    


 


Calcium  7.0 mg/dL (8.4-10.2)  L  18  04:25    


 


Magnesium  1.3 mg/dL (1.6-2.3)  L  18  04:25    


 


Total Bilirubin  0.6 mg/dL (0.2-1.3)   18  04:25    


 


AST  16 U/L (14-36)   18  04:25    


 


ALT  26 U/L (9-52)   18  04:25    


 


Alkaline Phosphatase  34 U/L ()  L  18  04:25    


 


Total Protein  4.5 g/dL (6.3-8.2)  L  18  04:25    


 


Albumin  2.3 g/dL (3.5-5.0)  L  18  04:25    


 


Urine Color  Yellow   12/15/18  16:12    


 


Urine Appearance  Clear  (Clear)   12/15/18  16:12    


 


Urine pH  6.5  (5.0-8.0)   12/15/18  16:12    


 


Ur Specific Gravity  1.013  (1.001-1.035)   12/15/18  16:12    


 


Urine Protein  Negative  (Negative)   12/15/18  16:12    


 


Urine Glucose (UA)  Negative  (Negative)   12/15/18  16:12    


 


Urine Ketones  Negative  (Negative)   12/15/18  16:12    


 


Urine Blood  Moderate  (Negative)  H  12/15/18  16:12    


 


Urine Nitrite  Negative  (Negative)   12/15/18  16:12    


 


Urine Bilirubin  Negative  (Negative)   12/15/18  16:12    


 


Urine Urobilinogen  <2.0 mg/dL (<2.0)   12/15/18  16:12    


 


Ur Leukocyte Esterase  Negative  (Negative)   12/15/18  16:12    


 


Urine RBC  32 /hpf (0-5)  H  12/15/18  16:12    


 


Urine WBC  1 /hpf (0-5)   12/15/18  16:12    


 


Ur Squamous Epith Cells  1 /hpf (0-4)   12/15/18  16:12    


 


Urine Mucus  Rare /hpf (None)  H  12/15/18  16:12    











CBC & Chem 7: 


 18 04:25





 18 04:25


Labs: 


 Abnormal Lab Results - Last 24 Hours (Table)











  18 Range/Units





  00:38 04:25 04:25 


 


WBC   11.9 H   (3.8-10.6)  k/uL


 


RBC   3.51 L   (3.80-5.40)  m/uL


 


Hgb   11.2 L D   (11.4-16.0)  gm/dL


 


Neutrophils #   11.6 H   (1.3-7.7)  k/uL


 


Lymphocytes #   0.1 L   (1.0-4.8)  k/uL


 


Chloride    116 H  ()  mmol/L


 


Carbon Dioxide    20 L  (22-30)  mmol/L


 


POC Glucose (mg/dL)  109 H    (75-99)  mg/dL


 


Calcium    7.0 L  (8.4-10.2)  mg/dL


 


Magnesium     (1.6-2.3)  mg/dL


 


Alkaline Phosphatase    34 L  ()  U/L


 


Total Protein    4.5 L  (6.3-8.2)  g/dL


 


Albumin    2.3 L  (3.5-5.0)  g/dL














  18 Range/Units





  04:25 


 


WBC   (3.8-10.6)  k/uL


 


RBC   (3.80-5.40)  m/uL


 


Hgb   (11.4-16.0)  gm/dL


 


Neutrophils #   (1.3-7.7)  k/uL


 


Lymphocytes #   (1.0-4.8)  k/uL


 


Chloride   ()  mmol/L


 


Carbon Dioxide   (22-30)  mmol/L


 


POC Glucose (mg/dL)   (75-99)  mg/dL


 


Calcium   (8.4-10.2)  mg/dL


 


Magnesium  1.3 L  (1.6-2.3)  mg/dL


 


Alkaline Phosphatase   ()  U/L


 


Total Protein   (6.3-8.2)  g/dL


 


Albumin   (3.5-5.0)  g/dL








 Microbiology - Last 24 Hours (Table)











 12/15/18 16:12 Urine Culture - Final





 Urine,Clean Catch 


 


 12/15/18 16:32 Blood Culture Gram Stain - Preliminary





 Blood 


 


 12/15/18 16:32 Blood Culture Gram Stain - Preliminary





 Blood Blood Culture - Preliminary





    Strep pyogenes (grp a)


 


 12/15/18 16:32 Blood Culture - Final





 Blood 








 Microbiology





12/15/18 16:12   Urine,Clean Catch   Urine Culture - Final


12/15/18 16:32   Blood   Blood Culture Gram Stain - Preliminary


12/15/18 16:32   Blood   Blood Culture Gram Stain - Preliminary


12/15/18 16:32   Blood   Blood Culture - Preliminary


                            Strep pyogenes (grp a)


12/15/18 16:32   Blood   Blood Culture - Final











Assessment and Plan


(1) Cellulitis of right leg


Narrative/Plan: 


38-year-old woman presents to Hospital feeling acutely ill with high-grade 

fever chills and rigors associated with the significant pain and swelling to 

the right lower extremity.  No new acute lesions or injuries of occurred but 

she has the chronic abnormality to the skin and interspaces of her toes which 

appears to be the portal of entry.  She is evidence of significant 

streptococcal sepsis coming from that site with the cellulitis ascending 

lymphangitis and lymphadenopathy.  She has severe sepsis as noted by the 

elevated lactic acid, and the significant infection to the right leg and 

bacteremia.  Antimicrobial therapy will be tailored as we have further data, 

she originally had streptococcal infection in the past.  We'll de-escalate 

antibiotic therapy as soon as possible.  Follow blood cultures will be 

requested.  She is having relative hypotension and will add in tramadol for 

some pain control with Tylenol.  Elevate the limb while at rest.  Topical 

antifungal therapy will be utilized to the interspace and systemic fluconazole 

his early been added.  Should have further evaluation in the outpatient setting 

regarding the abnormality to the foot.


Current Visit: Yes   Status: Acute   Code(s): L03.115 - CELLULITIS OF RIGHT 

LOWER LIMB   SNOMED Code(s): 084528376


   





(2) Acute lymphangitis of right lower extremity


Current Visit: Yes   Status: Acute   Code(s): L03.125 - ACUTE LYMPHANGITIS OF 

RIGHT LOWER LIMB   SNOMED Code(s): 5707378


   





(3) Sepsis due to Streptococcus pyogenes


Current Visit: Yes   Status: Acute   Code(s): A40.0 - SEPSIS DUE TO 

STREPTOCOCCUS, GROUP A   SNOMED Code(s): 584904156

## 2018-12-16 NOTE — P.CNPUL
History of Present Illness


Consult date: 18


Reason for consult: other (Acute sepsis)


Chief complaint: Right leg swelling and pain


History of present illness: 





This is a 38-year-old female with history of chronic cellulitis involving the 

right lower extremity.  History of sepsis and bacteremia secondary to 

Streptococcus. pyogenes, history of chronic cellulitis and possible fungal 

infection of the interdigital area of toes.  History of multiple medical 

problems including migraine cephalgia, cervical cancer, GERD, hoarse voice, and 

sometimes difficulty swallowing.  Patient presented to the ER last night with 

mostly symptoms of one day history of right lower extremity pain, swelling, 

tenderness, and fever as high as 101.3.  Clearly upon presentation the patient 

was septic she had elevated lactic acid, she was hypotensive, had leukocytosis, 

initial lactic acid was 4.8.  Patient received 3 L of fluid boluses, finally 

responded to fluid boluses, did not require placement on norepinephrine.  I was 

made aware of this patient last night, and recommended admission to the ICU.  I 

did recommend vancomycin, initially she was given in addition to vancomycin and 

cefepime, however I switch her today to a Rocephin after reviewing the previous 

microbiology on this patient from the last admission.  Patient was seen in the 

past by Dr. Pena, and she had a similar presentation not too long ago.  Her 

last evaluation by Dr. Pena was on 2018.  According to his note, the 

patient had multiple evaluations in the past for cellulitis involving the right 

lower extremity.  At her initial episode was when she was 16 years old.  

According to his previous note, the patient had chronic skin condition that 

causes skin breakdown, and she develops fungal infection, and then she also 

develops invasive bacterial infection.  In the past she has been treated on 

outpatient basis with Keflex and Diflucan.





Review of Systems


CONSTITUTIONAL: Fever, fatigue, no weight loss.


HEENT: No blurred vision, no dizziness, no sore throat, no difficulty with 

hearing.


CARDIOVASCULAR: No chest pain, orthopnea, PND, no palpitations, no syncope. 


PULMONARY: No shortness of breath, no cough, no wheezing no chest pain.


GASTROINTESTINAL: No nausea no vomiting no abdominal pain no melena no 

hematemesis 


NEUROLOGICAL: No headaches, no weakness, no numbness. 


HEMATOLOGICAL: Denies any bleeding or petechiae. 


GENITOURINARY: No dysuria and no frequency no urgency no hematuria.


MUSCULOSKELETAL/RHEUMATOLOGICAL: Denies any joint pain, swelling, or any muscle 

pain. 


ENDOCRINE: Denies any polyuria or polydipsia. 


Skin: As noted in HPI.











Past Medical History


Past Medical History: Cancer, Skin Disorder


Additional Past Medical History / Comment(s): migraines, hoarse voice and diff 

swallowing-recent choking, dry skin on foot, hx cellulitis, anemia, hx cervical 

cancer


History of Any Multi-Drug Resistant Organisms: None Reported


Past Surgical History:  Section, Orthopedic Surgery


Additional Past Surgical History / Comment(s): LEEP procedure, left knee 

arthroscopy, oral surgery


Past Anesthesia/Blood Transfusion Reactions: Previous Problems w/ Anesthesia


Additional Past Anesthesia/Blood Transfusion Reaction / Comment(s): spinal 

anesthesia for C/S-"stopped breathing/paralyzed lungs"


Past Psychological History: No Psychological Hx Reported


Additional Psychological History / Comment(s): She has been a smoker on and off 

for 20 years.  She denies any medical marijuana, marijuana, street drug use.  

She denies any alcohol use.  She lives at home with family members and her 

children.  There are no pets in the home.  She has had no recent travel.  Past 

Drug Use History: None Reported


Smoking Status: Current some day smoker


Past Alcohol Use History: None Reported


Additional Past Alcohol Use History / Comment(s): occ smoking


Past Drug Use History: None Reported





- Past Family History


  ** Father


Family Medical History: Diabetes Mellitus, Hypertension





  ** Mother


Family Medical History: No Reported History





Medications and Allergies


 Home Medications











 Medication  Instructions  Recorded  Confirmed  Type


 


No Known Home Medications  18 History











 Allergies











Allergy/AdvReac Type Severity Reaction Status Date / Time


 


No Known Allergies Allergy   Verified 18 08:58














Physical Exam


Vitals: 


 Vital Signs











  Temp Pulse Resp BP Pulse Ox


 


 18 11:00   102 H  18  105/63  96


 


 18 10:00   96  23  105/63  95


 


 18 09:00   98  14  99/62  98


 


 18 08:00  99.3 F  96  24  97/62  98


 


 18 07:00   97  13  95/64  97


 


 18 06:00   91  14  85/52  98


 


 18 05:00   91  12  90/54  98


 


 18 04:00  98.7 F  91  14  92/59  96


 


 18 03:00   97  17  88/53  96


 


 18 02:00   98  17  96/61  98


 


 18 01:00   102 H  15  88/60  97


 


 18 00:30  98.1 F   14  


 


 18 00:26  99.5 F  103 H  18  96/54  98


 


 18 00:10   108 H  18  100/53  98


 


 18 00:00  98.1 F  107 H  27 H  90/69  98


 


 12/15/18 23:41   108 H  18  99/52  98


 


 12/15/18 23:36  99.4 F  107 H  18  99/50  98


 


 12/15/18 23:33   106 H  27 H  145/101 


 


 12/15/18 23:31  99.4 F  105 H  18  


 


 12/15/18 23:30   105 H  10 L  77/62  98


 


 12/15/18 23:25   106 H  11 L  87/40 


 


 12/15/18 23:20   108 H  21  72/33 


 


 12/15/18 23:15   106 H  10 L  82/50  98


 


 12/15/18 23:10   105 H  8 L  79/45  98


 


 12/15/18 23:05   109 H  12  82/40 


 


 12/15/18 23:00   103 H  12  80/37  98


 


 12/15/18 22:57  100.1 F H  102 H  18  80/37  98


 


 12/15/18 22:55   105 H  13  96/46 


 


 12/15/18 22:50   107 H  22  89/47 


 


 12/15/18 22:45   107 H  18  88/47 


 


 12/15/18 22:40   107 H  16  92/47  97


 


 12/15/18 22:35   107 H  16  96/50  97


 


 12/15/18 22:30   106 H  18  88/45  97


 


 12/15/18 22:25   112 H  20  93/48 


 


 12/15/18 22:20   112 H  17  90/45 


 


 12/15/18 22:15   112 H  16  95/49 


 


 12/15/18 22:10   110 H  19  96/50 


 


 12/15/18 22:05   111 H  22  102/60 


 


 12/15/18 22:00   116 H  33 H  88/40  96


 


 12/15/18 21:55   113 H  21  97/49 


 


 12/15/18 21:50   110 H  18  93/50 


 


 12/15/18 21:45   108 H  23  100/44 


 


 /15/18 21:40   112 H  25 H  106/41  98


 


 /15/18 21:39   112 H  18  106/41  99


 


 15/18 21:35   116 H  16  99/40  99


 


 15/18 21:30  100.4 F H  112 H  18  101/40  98


 


 15/18 21:25   113 H  20  91/48  98


 


 15/18 21:20   110 H  20  90/48 


 


 1518 21:15   112 H  24  91/53 


 


 15/18 21:10   115 H  18  86/54 


 


 1518 21:05   112 H  16  94/52 


 


 15/18 21:00   112 H  17  92/54 


 


 12/15/18 20:59  99 F  112 H  18  92/54  99


 


 1518 20:55   111 H  12  90/47 


 


 12/15/18 20:50   112 H  20  112/56  98


 


 12/15/18 20:45   121 H  17  91/57 


 


 12/15/18 20:40   124 H  22  85/46  98


 


 12/15/18 20:35   117 H  19  87/44  99


 


 12/15/18 20:30   115 H  20  93/46 


 


 15/18 20:25   116 H  21  95/46 


 


 12/15/18 20:20   123 H  17  93/46 


 


 12/15/18 20:15   115 H  12  95/44 


 


 15/18 20:12  100.8 F H  117 H  18  95/44  99


 


 12/15/18 20:10   116 H  17  100/48 


 


 12/15/18 20:05   121 H  26 H  81/56 


 


 12/15/18 20:00   122 H  19  98/49  99


 


 15/18 19:45   118 H  15  88/44  98


 


 15/18 19:30   126 H  11 L  90/43 


 


 15/18 19:29  102.8 F H  128 H  20  90/43  98


 


 15/18 19:15   124 H  7 L  83/46 


 


 1518 19:00   135 H  21  102/54 


 


 1518 18:45   131 H  13  111/63 


 


 15/18 18:30   124 H  12  112/65 


 


 1518 18:15   128 H  19  115/52  85 L


 


 12/15/18 18:00  104.5 F H  126 H  17  129/63  97


 


 12/15/18 17:45   124 H  26 H  120/79 


 


 12/15/18 17:30   120 H  13  111/72 


 


 12/15/18 17:15   120 H  21  118/68 


 


 12/15/18 17:00   120 H  12  121/77 


 


 12/15/18 16:45   125 H  20  121/77  100


 


 12/15/18 16:30   126 H  17  127/63 


 


 12/15/18 16:20   138 H  35 H  


 


 12/15/18 16:06  101.3 F H  138 H  20  128/81  100








 Intake and Output











 12/15/18 12/16/18 12/16/18





 22:59 06:59 14:59


 


Intake Total  5100 600


 


Output Total 1100 125 200


 


Balance -1100 4975 400


 


Intake:   


 


  IV  5100 500


 


    Sodium Chloride 0.9% 1,  5100 500





    000 ml @ 100 mls/hr IV .   





    Q10H CHENTE Rx#:[id number]   


 


  Oral   100


 


Output:   


 


  Urine 1100 125 200


 


Other:   


 


  # Voids  0 0


 


  Weight 68.039 kg 67 kg 

















GENERAL: Revealed a 38-year-old female in no distress, very pleasant.


HEENT: Pupils are round and equally reacting to light. EOMI. No scleral 

icterus. No conjunctival pallor. Normocephalic, atraumatic. No pharyngeal 

erythema. No thyromegaly. 


CARDIOVASCULAR: S1 and S2 present. No murmurs, rubs, or gallops. 


PULMONARY: Clear throughout, no crackles or rhonchi or wheezes, no chest wall 

tenderness.


ABDOMEN: Soft, nontender, nondistended, normoactive bowel sounds. No palpable 

organomegaly. 


MUSCULOSKELETAL: No joint swelling or deformity.


EXTREMITIES: Swelling, erythema, tenderness over the right lower extremity.  

Palpable distal pulses.


NEUROLOGICAL: Gross neurological examination did not reveal any focal deficits. 


SKIN: Significant  breaking of the skin noted in the interdigital area between 

between the toes.  Of the right foot.


Psychiatric: Normal mood, affect and mental status examination.





Results





- Laboratory Findings


CBC and BMP: 


 18 04:25





 18 04:25


PT/INR, D-dimer











PT  9.7 sec (9.0-12.0)   12/15/18  16:32    


 


INR  0.9  (<1.2)   12/15/18  16:32    








Abnormal lab findings: 


 Abnormal Labs











  12/15/18 12/15/18 12/15/18





  16:12 16:32 16:32


 


WBC   13.2 H 


 


RBC   


 


Hgb   


 


Neutrophils #   12.7 H 


 


Lymphocytes #   0.2 L 


 


APTT   


 


Chloride   


 


Carbon Dioxide    21 L


 


POC Glucose (mg/dL)   


 


Plasma Lactic Acid Antonio   


 


Calcium   


 


Magnesium   


 


Alkaline Phosphatase   


 


Total Protein   


 


Albumin   


 


Urine Blood  Moderate H  


 


Urine RBC  32 H  


 


Urine Mucus  Rare H  














  12/15/18 12/15/18 12/16/18





  16:32 16:32 00:38


 


WBC   


 


RBC   


 


Hgb   


 


Neutrophils #   


 


Lymphocytes #   


 


APTT   19.0 L 


 


Chloride   


 


Carbon Dioxide   


 


POC Glucose (mg/dL)    109 H


 


Plasma Lactic Acid Antonio  4.8 H*  


 


Calcium   


 


Magnesium   


 


Alkaline Phosphatase   


 


Total Protein   


 


Albumin   


 


Urine Blood   


 


Urine RBC   


 


Urine Mucus   














  18





  04:25 04:25 04:25


 


WBC  11.9 H  


 


RBC  3.51 L  


 


Hgb  11.2 L D  


 


Neutrophils #  11.6 H  


 


Lymphocytes #  0.1 L  


 


APTT   


 


Chloride   116 H 


 


Carbon Dioxide   20 L 


 


POC Glucose (mg/dL)   


 


Plasma Lactic Acid Antonio   


 


Calcium   7.0 L 


 


Magnesium    1.3 L


 


Alkaline Phosphatase   34 L 


 


Total Protein   4.5 L 


 


Albumin   2.3 L 


 


Urine Blood   


 


Urine RBC   


 


Urine Mucus   














- Diagnostic Findings


Chest x-ray: image reviewed (By basilar atelectasis, small tiny pleural 

effusions noted.)





Assessment and Plan


Assessment: 





Impression:





1 acute sepsis secondary to right lower extremity cellulitis, most likely 

streptococcal in nature, however considering the presentation, patient will be 

started on vancomycin and Rocephin.  Empirically until the final cultures are 

available.





2 acute bacteremia secondary to Streptococcus.pyogenes group a, the source is 

the right lower extremity unless for otherwise.





3 acute cellulitis of the right lower extremity





4 interdigital fungal infection, patient was already started on Diflucan, local 

antifungal's maybe use, however with a fair recommendation to infectious 

disease on the case.





5 nicotine dependence syndrome, patient was already counseled.





6 history of migraine cephalgia





7 history of GERD.





Recommendation: Continue to monitor in the ICU, continue present antibiotics, 

however looking at previous cultures and previous sensitivities, Rocephin alone 

could be used hence I will discontinue vancomycin unless felt to be changed 

again by infectious disease on the case.  Patient will be given GI and DVT 

prophylaxis, we'll continue to follow closely.  Continue Diflucan.  Will 

follow.  Prognosis is guarded, may consider ordering a venous Doppler on the 

right lower extremity.  If not already done.


Time with Patient: Greater than 30

## 2018-12-16 NOTE — HP
HISTORY AND PHYSICAL



CHIEF COMPLAINTS:

Fever and leg pain.



HISTORY OF PRESENT ILLNESS:

This 38-year-old woman with a past history of migraines, history of  
section,

history of the multiple infections, cellulitis of the leg and also with fungal

infection between being followed by Dr. Mendez in the outpatient setting,

previously admitted to [facility] on multiple occasions.  Currently the

patient is complaining of severe pain in the right leg which is increasing in

intensity.  Some swelling over the past couple of days, went up to the thigh 
and the

patient became feverish and weak.  Patient came to [facility].  
Patient found

to have hypotension and tachycardia and significant cellulitis of the right leg 
and

patient admitted for further evaluation and treatment. IV fluid bolus was about 
3 L

given.  The patient seen in the ICU.  Broad-spectrum IV antibiotics were given.
  Of

note in , grown from the cultures which is again growing from the blood

cultures right now and currently.  The patient is started on vancomycin and 
Rocephin at

this time.  Dr. Rios, infectious disease following the patient closely.  
There is no

history any headache,  loss consciousness, seizures at this time.  No chest pain
,

palpitations, hematochezia or melena.



PAST MEDICAL HISTORY:

History of fungal infection, skin disorder, migraine, hoarse voice, history of

cellulitis and  section.



MEDICATIONS:

Prior to admission home medications are: None.



ALLERGIES:

None.



FAMILY HISTORY:

History of diabetes and hypertension in the family.



SOCIAL HISTORY:

Previous history of smoking on a daily basis.  No history of alcohol intake.



REVIEW OF SYSTEMS:

ENT: No diminished hearing or vision.

CARDIOVASCULAR: No angina or palpitations.

RESPIRATORY: No cough or hemoptysis.

GI:  No nausea or vomiting.

 no dysuria.

Nervous system: No numbness or weakness.

ALLERGY/IMMUNOLOGY: No asthma or hayfever.

MUSCULOSKELETAL:  As mentioned earlier.

HEMATOLOGY/ONCOLOGY:  No history of anemia.

ENDOCRINE:  No history of diabetes or hypothyroidism.

CONSTITUTIONAL: As mentioned earlier.

Dermatology: Negative.  Rheumatology: Negative. Psychiatry: As mentioned 
earlier.



PHYSICAL EXAMINATION:

GENERAL: The patient is alert and oriented times three.

VITAL SIGNS: Pulse 96, blood pressure is 105/60,  respirations 23, temperature 
is

normal.  Pulse ox 94% on room air.

HEENT:  Conjunctivae normal.  Oral mucosa moist.

NECK is no jugular venous distention.  No carotid bruit. No lymph node 
enlargement.

CARDIOVASCULAR: S1, S2 muffled.

RESPIRATORY: Breath sounds diminished in the bases.  Scattered rhonchi.  No 
crackles.

ABDOMEN:  Soft, nontender.  No mass.

LEGS: Significant swelling and edema, erythema, tenderness of the right leg 
extending

from the ankle all the way up to the thigh.  Lymph nodes are not palpable,  
visible in

the ultrasound.  There is some erythema  also present.  Pulses felt normally.

Minimal tenderness also present diffusely.

CENTRAL NERVOUS SYSTEM:  Higher functions as mentioned earlier.  Moves all 4 
limbs.  No

focal motor or sensory deficits.  LYMPHATICS:  No lymph nodes in the neck and 
axilla.

SKIN:  No ulcer, rash or bleeding.



LABS:

WBC 11.2, hemoglobin 11.2. Albumin 2.6.



ASSESSMENT:

1. Acute cellulitis of the right leg with severe sepsis and septic shock and

    hypotension.

2. History of previous multiple sepsis.

3. Fungal infection in the first interdigital cleft.

4. Hypomagnesemia.

5. Increased WBC.

6. History of  section.

7. History of recurrent cellulitis on the right side.

8. History of migraine.

9. History of degenerative joint disease.

10.History of nicotine dependence.



RECOMMENDATIONS AND DISCUSSION:

In this 38-year-old woman who presented with multiple complex medical issues, 
we will

monitor the patient closely.  We will initiate broad-spectrum IV antibiotics.  
The

patient is started on Rocephin.  Otherwise, the patient also received 1 dose of

vancomycin.  We will follow closely with Infectious disease as mentioned 
earlier.  The

patient has grown  step pyogenes group previously.  The prognosis guarded 
because of

multiple complex medical issues. Further recommendations to follow.  See orders 
for

pain management.  Further recommendations to follow.





MMODL / IJN: [id number] / Job#: [id number]

MTDD

## 2018-12-16 NOTE — XR
EXAMINATION TYPE: XR chest 1V portable

 

DATE OF EXAM: 12/16/2018

 

HISTORY: ICU Management.

 

REFERENCE: Previous study dated 12/15/2018.

 

FINDINGS: There is developing bibasilar airspace disease. This may represent atelectasis or pneumonia
. The heart is mildly prominent. I could not exclude scant effusions.

 

IMPRESSION: 

 

WORSENING APPEARANCE TO THE CHEST WITH BIBASILAR AIRSPACE DISEASE, MILD CARDIOMEGALY AND QUESTIONABLE
, SMALL, BILATERAL EFFUSIONS.

## 2018-12-17 LAB
ANION GAP SERPL CALC-SCNC: 4 MMOL/L
BASOPHILS # BLD AUTO: 0 K/UL (ref 0–0.2)
BASOPHILS NFR BLD AUTO: 0 %
BUN SERPL-SCNC: 13 MG/DL (ref 7–17)
CALCIUM SPEC-MCNC: 6.9 MG/DL (ref 8.4–10.2)
CHLORIDE SERPL-SCNC: 114 MMOL/L (ref 98–107)
CO2 SERPL-SCNC: 18 MMOL/L (ref 22–30)
EOSINOPHIL # BLD AUTO: 0.1 K/UL (ref 0–0.7)
EOSINOPHIL NFR BLD AUTO: 1 %
ERYTHROCYTE [DISTWIDTH] IN BLOOD BY AUTOMATED COUNT: 3.3 M/UL (ref 3.8–5.4)
ERYTHROCYTE [DISTWIDTH] IN BLOOD: 13.7 % (ref 11.5–15.5)
GLUCOSE BLD-MCNC: 106 MG/DL (ref 75–99)
GLUCOSE SERPL-MCNC: 90 MG/DL (ref 74–99)
HCT VFR BLD AUTO: 33 % (ref 34–46)
HGB BLD-MCNC: 10.6 GM/DL (ref 11.4–16)
LYMPHOCYTES # SPEC AUTO: 0.2 K/UL (ref 1–4.8)
LYMPHOCYTES NFR SPEC AUTO: 2 %
MAGNESIUM SPEC-SCNC: 2.8 MG/DL (ref 1.6–2.3)
MCH RBC QN AUTO: 32.1 PG (ref 25–35)
MCHC RBC AUTO-ENTMCNC: 32.1 G/DL (ref 31–37)
MCV RBC AUTO: 99.9 FL (ref 80–100)
MONOCYTES # BLD AUTO: 0.2 K/UL (ref 0–1)
MONOCYTES NFR BLD AUTO: 2 %
NEUTROPHILS # BLD AUTO: 9.7 K/UL (ref 1.3–7.7)
NEUTROPHILS NFR BLD AUTO: 95 %
PLATELET # BLD AUTO: 192 K/UL (ref 150–450)
POTASSIUM SERPL-SCNC: 3.8 MMOL/L (ref 3.5–5.1)
SODIUM SERPL-SCNC: 136 MMOL/L (ref 137–145)
WBC # BLD AUTO: 10.3 K/UL (ref 3.8–10.6)

## 2018-12-17 RX ADMIN — HEPARIN SODIUM SCH UNIT: 5000 INJECTION, SOLUTION INTRAVENOUS; SUBCUTANEOUS at 08:47

## 2018-12-17 RX ADMIN — CLOTRIMAZOLE SCH APPLIC: 0.01 CREAM TOPICAL at 22:29

## 2018-12-17 RX ADMIN — CLOTRIMAZOLE SCH APPLIC: 0.01 CREAM TOPICAL at 08:47

## 2018-12-17 RX ADMIN — PANTOPRAZOLE SODIUM SCH MG: 40 INJECTION, POWDER, FOR SOLUTION INTRAVENOUS at 08:48

## 2018-12-17 RX ADMIN — IBUPROFEN PRN MG: 400 TABLET, FILM COATED ORAL at 06:08

## 2018-12-17 RX ADMIN — ACETAMINOPHEN PRN MG: 325 TABLET, FILM COATED ORAL at 22:22

## 2018-12-17 RX ADMIN — ACETAMINOPHEN PRN MG: 325 TABLET, FILM COATED ORAL at 06:07

## 2018-12-17 RX ADMIN — ONDANSETRON PRN MG: 2 INJECTION INTRAMUSCULAR; INTRAVENOUS at 12:17

## 2018-12-17 RX ADMIN — MAGNESIUM SULFATE IN DEXTROSE SCH: 10 INJECTION, SOLUTION INTRAVENOUS at 00:33

## 2018-12-17 RX ADMIN — HEPARIN SODIUM SCH UNIT: 5000 INJECTION, SOLUTION INTRAVENOUS; SUBCUTANEOUS at 22:23

## 2018-12-17 RX ADMIN — HYDROMORPHONE HYDROCHLORIDE PRN MG: 1 INJECTION, SOLUTION INTRAMUSCULAR; INTRAVENOUS; SUBCUTANEOUS at 08:48

## 2018-12-17 RX ADMIN — IBUPROFEN PRN MG: 400 TABLET, FILM COATED ORAL at 22:23

## 2018-12-17 RX ADMIN — HYDROMORPHONE HYDROCHLORIDE PRN MG: 1 INJECTION, SOLUTION INTRAMUSCULAR; INTRAVENOUS; SUBCUTANEOUS at 22:23

## 2018-12-17 RX ADMIN — SODIUM CHLORIDE SCH MLS/HR: 900 INJECTION, SOLUTION INTRAVENOUS at 09:51

## 2018-12-17 RX ADMIN — HYDROMORPHONE HYDROCHLORIDE PRN MG: 1 INJECTION, SOLUTION INTRAMUSCULAR; INTRAVENOUS; SUBCUTANEOUS at 18:24

## 2018-12-17 RX ADMIN — FLUCONAZOLE SCH MG: 100 TABLET ORAL at 08:49

## 2018-12-17 NOTE — P.PN
Subjective


Progress Note Date: 12/17/18


Principal diagnosis: 


 Acute sepsis secondary to right lower extremity cellulitis, with strep a 

bacteremia





This is a 38-year-old female with history of chronic cellulitis involving the 

right lower extremity.  History of sepsis and bacteremia secondary to 

Streptococcus. pyogenes, history of chronic cellulitis and possible fungal 

infection of the interdigital area of toes.  History of multiple medical 

problems including migraine cephalgia, cervical cancer, GERD, hoarse voice, and 

sometimes difficulty swallowing.  Patient presented to the ER last night with 

mostly symptoms of one day history of right lower extremity pain, swelling, 

tenderness, and fever as high as 101.3.  Clearly upon presentation the patient 

was septic she had elevated lactic acid, she was hypotensive, had leukocytosis, 

initial lactic acid was 4.8.  Patient received 3 L of fluid boluses, finally 

responded to fluid boluses, did not require placement on norepinephrine.  I was 

made aware of this patient last night, and recommended admission to the ICU.  I 

did recommend vancomycin, initially she was given in addition to vancomycin and 

cefepime, however I switch her today to a Rocephin after reviewing the previous 

microbiology on this patient from the last admission.  Patient was seen in the 

past by Dr. Pena, and she had a similar presentation not too long ago.  Her 

last evaluation by Dr. Pena was on 7/1/2018.  According to his note, the 

patient had multiple evaluations in the past for cellulitis involving the right 

lower extremity.  At her initial episode was when she was 16 years old.  

According to his previous note, the patient had chronic skin condition that 

causes skin breakdown, and she develops fungal infection, and then she also 

develops invasive bacterial infection.  In the past she has been treated on 

outpatient basis with Keflex and Diflucan.





On 12/17/2018 patient seen in follow-up in the intensive care unit.  She is 

awake and alert, in no acute distress, right lower leg is still quite swollen, 

tender to touch and warm.  Patient is hemodynamically stable, T-max in the last 

24 hours was 102.7F.  Saturations on 3 L per nasal cannula is 96%, sinus 

rhythm on the monitor, the controlled rate.  Blood cultures showed strep 

pyogenes in the blood cultures.  Patient is currently on a combination of 

Rocephin and vancomycin.  Dopplers of the right lower extremity was negative, 

and the study was suboptimal he did not show any convincing evidence of acute 

DVT.  There were abnormal right groin lymph nodes noted greater than 1 cm, 

could be related to infection or inflammation.  Today's chest x-ray was reviewed

, and shows is consistent with fluid overload, pleural effusions and central 

vascular congestion.  Today's labs have been reviewed, WBCs 10.3, hemoglobin is 

10.6, sodium is 136, potassium is 3.8, chloride is 114, CO2 is 18, and BUN was 

13 and creatinine 0.73. 











Objective





- Vital Signs


Vital signs: 


 Vital Signs











Temp  98.8 F   12/17/18 08:00


 


Pulse  98   12/17/18 08:00


 


Resp  27 H  12/17/18 08:00


 


BP  108/57   12/17/18 08:00


 


Pulse Ox  96   12/17/18 08:00








 Intake & Output











 12/16/18 12/17/18 12/17/18





 18:59 06:59 18:59


 


Intake Total 1650 1500 100


 


Output Total 800 660 


 


Balance 850 840 100


 


Weight  76 kg 


 


Intake:   


 


  IV 1200 1200 100


 


    Sodium Chloride 0.9% 1, 1200 1200 100





    000 ml @ 100 mls/hr IV .   





    Q10H [facility] Rx#:[id number]   


 


  Intake, IV Titration 350 300 





  Amount   


 


    Magnesium Sulfate-D5w Pmx  300 





    1 gm In Dextrose/Water 1   





    100ml.bag @ 100 mls/hr   





    IVPB Q1H [facility] Rx#:   





    [id number]   


 


    Vancomycin 1,250 mg In 250  





    Sodium Chloride 0.9% 250   





    ml @ 125 mls/hr IVPB Q12H   





    [facility] Rx#:[id number]   


 


    cefTRIAXone 2,000 mg In 100  





    Sodium Chloride 0.9% 100   





    ml @ 100 mls/hr IVPB   





    Q24HR [facility] Rx#:[id number]   


 


  Oral 100  


 


Output:   


 


  Urine 800 660 


 


Other:   


 


  # Voids 1  


 


  # Bowel Movements 1 1 














- Exam


GENERAL: Revealed a 38-year-old female in no distress, very pleasant.


HEENT: Pupils are round and equally reacting to light. EOMI. No scleral 

icterus. No conjunctival pallor. Normocephalic, atraumatic. No pharyngeal 

erythema. No thyromegaly. 


CARDIOVASCULAR: S1 and S2 present. No murmurs, rubs, or gallops. 


PULMONARY: Clear throughout, no crackles or rhonchi or wheezes, no chest wall 

tenderness.


ABDOMEN: Soft, nontender, nondistended, normoactive bowel sounds. No palpable 

organomegaly. 


MUSCULOSKELETAL: No joint swelling or deformity.


EXTREMITIES: Swelling, erythema, tenderness over the right lower extremity 

extending from the groin area down to the ankle top of foot.  Palpable distal 

pulses.  Patient has nonpitting swelling of the left upper extremity due to IV 

fluid infiltration


NEUROLOGICAL: Gross neurological examination did not reveal any focal deficits. 


SKIN: Significant  breaking of the skin noted in the interdigital area between 

between the toes.  Of the right foot.


Psychiatric: Normal mood, affect and mental status examination.








- Labs


CBC & Chem 7: 


 12/17/18 04:56





 12/17/18 04:56


Labs: 


 Abnormal Lab Results - Last 24 Hours (Table)











  12/17/18 12/17/18 Range/Units





  04:56 04:56 


 


RBC  3.30 L   (3.80-5.40)  m/uL


 


Hgb  10.6 L   (11.4-16.0)  gm/dL


 


Hct  33.0 L   (34.0-46.0)  %


 


Neutrophils #  9.7 H   (1.3-7.7)  k/uL


 


Lymphocytes #  0.2 L   (1.0-4.8)  k/uL


 


Sodium   136 L  (137-145)  mmol/L


 


Chloride   114 H  ()  mmol/L


 


Carbon Dioxide   18 L  (22-30)  mmol/L


 


Calcium   6.9 L  (8.4-10.2)  mg/dL


 


Magnesium   2.8 H  (1.6-2.3)  mg/dL








 Microbiology - Last 24 Hours (Table)











 12/15/18 16:32 Blood Culture Gram Stain - Preliminary





 Blood 


 


 12/16/18 19:00 Skin Fungal Culture - Preliminary





 Skin Scrapings 


 


 12/15/18 16:12 Urine Culture - Final





 Urine,Clean Catch 


 


 12/15/18 16:32 Blood Culture Gram Stain - Preliminary





 Blood Blood Culture - Preliminary





    Strep pyogenes (grp a)


 


 12/15/18 16:32 Blood Culture - Final





 Blood 














Assessment and Plan


Plan: 


 1 acute sepsis secondary to right lower extremity cellulitis, most likely 

streptococcal in nature, however considering the presentation, patient will be 

started on vancomycin and Rocephin.  Empirically until the final cultures are 

available.





2 acute bacteremia secondary to Streptococcus.pyogenes group a, the source is 

the right lower extremity unless for otherwise.





3 acute cellulitis of the right lower extremity





4 interdigital fungal infection, patient was already started on Diflucan, local 

antifungal's maybe use, however with a fair recommendation to infectious 

disease on the case.





5 nicotine dependence syndrome, patient was already counseled.





6 history of migraine cephalgia





7 history of GERD.





Plan:





Antibiotic coverage per ID service.  We'll give the patient a dose of 40 mg of 

Lasix .  Today's chest x-ray has been reviewed by Dr. Arboleda, shows bilateral 

pleural effusions, and prominent central vascularity, fluid overload.  

Discontinue the IV fluids.  Hemodynamically patient remains stable, she still 

has intermittent fevers. ID Service is following.  Patient is stable to move 

out of the intensive care unit today to general medical floor





I performed a history & physical examination of the patient and discussed their 

management with my nurse practitioner, Sharmila Cast.  I reviewed the nurse 

practitioner's note and agree with the documented findings and plan of care.  

Lung sounds are clear.  The findings and the impression was discussed with the 

patient.  I attest to the documentation by the nurse practitioner. 





Time with Patient: Less than 30

## 2018-12-18 LAB
ANION GAP SERPL CALC-SCNC: 5 MMOL/L
BASOPHILS # BLD AUTO: 0 K/UL (ref 0–0.2)
BASOPHILS NFR BLD AUTO: 0 %
BUN SERPL-SCNC: 9 MG/DL (ref 7–17)
CALCIUM SPEC-MCNC: 7.3 MG/DL (ref 8.4–10.2)
CHLORIDE SERPL-SCNC: 109 MMOL/L (ref 98–107)
CO2 SERPL-SCNC: 21 MMOL/L (ref 22–30)
EOSINOPHIL # BLD AUTO: 0.1 K/UL (ref 0–0.7)
EOSINOPHIL NFR BLD AUTO: 2 %
ERYTHROCYTE [DISTWIDTH] IN BLOOD BY AUTOMATED COUNT: 3.12 M/UL (ref 3.8–5.4)
ERYTHROCYTE [DISTWIDTH] IN BLOOD: 13.6 % (ref 11.5–15.5)
GLUCOSE SERPL-MCNC: 91 MG/DL (ref 74–99)
HCT VFR BLD AUTO: 30.7 % (ref 34–46)
HGB BLD-MCNC: 10 GM/DL (ref 11.4–16)
LYMPHOCYTES # SPEC AUTO: 0.4 K/UL (ref 1–4.8)
LYMPHOCYTES NFR SPEC AUTO: 5 %
MAGNESIUM SPEC-SCNC: 2.4 MG/DL (ref 1.6–2.3)
MCH RBC QN AUTO: 31.9 PG (ref 25–35)
MCHC RBC AUTO-ENTMCNC: 32.5 G/DL (ref 31–37)
MCV RBC AUTO: 98.2 FL (ref 80–100)
MONOCYTES # BLD AUTO: 0.2 K/UL (ref 0–1)
MONOCYTES NFR BLD AUTO: 3 %
NEUTROPHILS # BLD AUTO: 6.5 K/UL (ref 1.3–7.7)
NEUTROPHILS NFR BLD AUTO: 88 %
PLATELET # BLD AUTO: 196 K/UL (ref 150–450)
POTASSIUM SERPL-SCNC: 3.5 MMOL/L (ref 3.5–5.1)
SODIUM SERPL-SCNC: 135 MMOL/L (ref 137–145)
WBC # BLD AUTO: 7.4 K/UL (ref 3.8–10.6)

## 2018-12-18 RX ADMIN — POTASSIUM CHLORIDE SCH MEQ: 20 TABLET, EXTENDED RELEASE ORAL at 09:32

## 2018-12-18 RX ADMIN — PANTOPRAZOLE SODIUM SCH MG: 40 INJECTION, POWDER, FOR SOLUTION INTRAVENOUS at 09:36

## 2018-12-18 RX ADMIN — HEPARIN SODIUM SCH UNIT: 5000 INJECTION, SOLUTION INTRAVENOUS; SUBCUTANEOUS at 21:30

## 2018-12-18 RX ADMIN — HEPARIN SODIUM SCH UNIT: 5000 INJECTION, SOLUTION INTRAVENOUS; SUBCUTANEOUS at 09:35

## 2018-12-18 RX ADMIN — ACETAMINOPHEN PRN MG: 325 TABLET, FILM COATED ORAL at 17:12

## 2018-12-18 RX ADMIN — ACETAMINOPHEN PRN MG: 325 TABLET, FILM COATED ORAL at 09:35

## 2018-12-18 RX ADMIN — SODIUM CHLORIDE SCH MLS/HR: 900 INJECTION, SOLUTION INTRAVENOUS at 09:32

## 2018-12-18 RX ADMIN — FLUCONAZOLE SCH MG: 100 TABLET ORAL at 09:35

## 2018-12-18 RX ADMIN — POTASSIUM CHLORIDE SCH MEQ: 20 TABLET, EXTENDED RELEASE ORAL at 07:00

## 2018-12-18 RX ADMIN — CLOTRIMAZOLE SCH APPLIC: 0.01 CREAM TOPICAL at 11:09

## 2018-12-18 NOTE — P.PN
Subjective


Progress Note Date: 12/18/18





38-year-old presents to Hospital with the sudden onset of pain and swelling and 

discomfort to the right lower extremity.  Is a known history of a 

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 

has been cared for by dermatology and podiatry in the past.  Despite the care 

she continues to have difficulty does have multiple hospitalizations regarding 

cellulitis of the right lower extremity.  Her last auscultation was much more 

brief and she was not nearly as ill as she has been in the past.





Apparently a few hours before she came to the emergency center she started 

feeling poorly with evidence of swelling and pain to the right leg.  When she 

started difficulties with ambulation she sought care in the emergency center 

where she was found to have a temperature of 104, she was with relative 

hypotension and evidence of acute sepsis.  He has in the past she had an 

extensive cellulitis right lower extremity with an ascending lymphangitis with 

tenderness to the right inguinal node.  She was with relative hypotension 

requiring fluid resuscitation.  With resuscitation and antipyretic she's 

feeling a bit better today leg is still swollen and quite painful.  She has 

significant lactic acidosis of 4.8 now improved to 1.1.  Antibiotic therapy was 

initiated the [facility] and emergency center with cefepime and vancomycin pending 

culture results.


12/17/2018 patient feels somewhat better today.  The intense pain has improved 

but certainly not resolved.  Fevers have improved.  Appetite is improving 

without ongoing nausea or emesis.


12/18/2018 patient is markedly improved today.  Pain is considerably improved.  

Swelling of breath also improved.





Objective





- Vital Signs


Vital signs: 


 Vital Signs











Temp  98.4 F   12/18/18 16:00


 


Pulse  84   12/18/18 16:00


 


Resp  16   12/18/18 16:00


 


BP  118/82   12/18/18 20:00


 


Pulse Ox  92 L  12/18/18 20:00








 Intake & Output











 12/18/18 12/18/18 12/19/18





 06:59 18:59 06:59


 


Intake Total 120 700 


 


Output Total 300 900 400


 


Balance -180 -200 -400


 


Weight 76 kg  


 


Intake:   


 


  IV 0  


 


    Sodium Chloride 0.9% 1, 0  





    000 ml @ 100 mls/hr IV .   





    Q10H [facility] Rx#:[id number]   


 


  Intake, IV Titration  100 





  Amount   


 


    cefTRIAXone 2,000 mg In  100 





    Sodium Chloride 0.9% 100   





    ml @ 100 mls/hr IVPB   





    Q24HR [facility] Rx#:[id number]   


 


  Oral 120 600 


 


Output:   


 


  Urine 300 900 400


 


Other:   


 


  # Voids 1  0














- Exam





Pleasant 38-year-old woman still very uncomfortable due to the pain and 

swelling of her right leg.  Fever has resolved hypotension improved


HEENT: Anicteric conjunctiva are pink and moist nasal mucosa grossly intact 

without significant lesions, there is no thrush.


Neck: The neck is supple without significant lymphadenopathy or thyromegaly.


Lungs: Good bilateral air entry without significant crackles or wheezing.  

There is no significant bronchial sounds.  There is no egophony or dullness.


Heart: Regular rate and rhythm with an audible S1-S2, no S3 no S4.  There is no 

significant murmur click or rub, PMI was nondisplaced.


Abdomen: Positive bowel sounds soft and nontender without palpable masses or 

organomegaly.  There was no guarding or rebound.


Extremities: The upper extremities have excellent pulses they are symmetric, no 

significant petechiae or telangiectasia.  No splinter hemorrhages were noted.  


The left lower extremity shows no acute abnormalities or significant edema.  

Right lower extremities shows evidence of some improvement to the erythema and 

edema that goes from the foot all the way to the fold of the thigh.  There is 

improvement in the swelling and intense tenderness to the right inguinal lymph 

nodes.  No open ulcerations or blisters are seen just a dense erythema that is 

now much less tender than at admission.  The interspaces show evidence of the 

thickened white material but does appear to be fungal in nature.


Neuro: Awake alert oriented to person place and time.  There are no acute new 

gross focal sensory motor deficits.








- Labs


CBC & Chem 7: 


 12/18/18 04:49





 12/18/18 04:49


Labs: 


 Abnormal Lab Results - Last 24 Hours (Table)











  12/18/18 12/18/18 Range/Units





  04:49 04:49 


 


RBC  3.12 L   (3.80-5.40)  m/uL


 


Hgb  10.0 L   (11.4-16.0)  gm/dL


 


Hct  30.7 L   (34.0-46.0)  %


 


Lymphocytes #  0.4 L   (1.0-4.8)  k/uL


 


Sodium   135 L  (137-145)  mmol/L


 


Chloride   109 H  ()  mmol/L


 


Carbon Dioxide   21 L  (22-30)  mmol/L


 


Calcium   7.3 L  (8.4-10.2)  mg/dL


 


Magnesium   2.4 H  (1.6-2.3)  mg/dL








 Microbiology - Last 24 Hours (Table)











 12/15/18 16:32 Blood Culture Gram Stain - Preliminary





 Blood Blood Culture - Preliminary





    Strep pyogenes (grp a)


 


 12/16/18 22:25 Blood Culture - Preliminary





 Blood    No Growth after 24 hours














Assessment and Plan


(1) Cellulitis of right leg


Narrative/Plan: 


38-year-old woman presents to Hospital feeling acutely ill with high-grade 

fever chills and rigors associated with the significant pain and swelling to 

the right lower extremity.  No new acute lesions or injuries of occurred but 

she has the chronic abnormality to the skin and interspaces of her toes which 

appears to be the portal of entry.  She is evidence of significant 

streptococcal sepsis coming from that site with the cellulitis ascending 

lymphangitis and lymphadenopathy.  She has severe sepsis as noted by the 

elevated lactic acid, and the significant infection to the right leg and 

bacteremia.  Antimicrobial therapy will be tailored as we have further data, 

she originally had streptococcal infection in the past.  We'll de-escalate 

antibiotic therapy as soon as possible.  Follow blood cultures will be 

requested.  She is having relative hypotension and will add in tramadol for 

some pain control with Tylenol.  Elevate the limb while at rest.  Topical 

antifungal therapy will be utilized to the interspace and systemic fluconazole 

his early been added.  Should have further evaluation in the outpatient setting 

regarding the abnormality to the foot.


12/17/2018 patient is having some improvement in the last day.  Still very 

uncomfortable but swelling erythema are improving and the intensity of the pain 

has lessened.  She's no longer having high-grade fevers.  There is some 

improvement and Streptococcus pyogenes  is isolated as it has been in the past.

  Continue antibiotic therapy with ceftriaxone and the antifungal therapy.  

Pain control is much improved today.  Continue elevating the limb she cannot 

tolerate compression.


12/18/2018 patient is now considerably improved over the last day.  The 

significant pain is much improved the swelling and erythema have also improved 

from an excellent response to Rocephin therapy. Will plan transition at 

discharge


Current Visit: Yes   Status: Acute   Code(s): L03.115 - CELLULITIS OF RIGHT 

LOWER LIMB   SNOMED Code(s): 933323517


   





(2) Acute lymphangitis of right lower extremity


Current Visit: Yes   Status: Acute   Code(s): L03.125 - ACUTE LYMPHANGITIS OF 

RIGHT LOWER LIMB   SNOMED Code(s): 7041724


   





(3) Sepsis due to Streptococcus pyogenes


Current Visit: Yes   Status: Acute   Code(s): A40.0 - SEPSIS DUE TO 

STREPTOCOCCUS, GROUP A   SNOMED Code(s): 075318811

## 2018-12-18 NOTE — P.PN
Subjective


Progress Note Date: 12/17/18





38-year-old presents to Hospital with the sudden onset of pain and swelling and 

discomfort to the right lower extremity.  Is a known history of a 

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 

has been cared for by dermatology and podiatry in the past.  Despite the care 

she continues to have difficulty does have multiple hospitalizations regarding 

cellulitis of the right lower extremity.  Her last auscultation was much more 

brief and she was not nearly as ill as she has been in the past.





Apparently a few hours before she came to the emergency center she started 

feeling poorly with evidence of swelling and pain to the right leg.  When she 

started difficulties with ambulation she sought care in the emergency center 

where she was found to have a temperature of 104, she was with relative 

hypotension and evidence of acute sepsis.  He has in the past she had an 

extensive cellulitis right lower extremity with an ascending lymphangitis with 

tenderness to the right inguinal node.  She was with relative hypotension 

requiring fluid resuscitation.  With resuscitation and antipyretic she's 

feeling a bit better today leg is still swollen and quite painful.  She has 

significant lactic acidosis of 4.8 now improved to 1.1.  Antibiotic therapy was 

initiated the [facility] and emergency center with cefepime and vancomycin pending 

culture results.


12/17/2018 patient feels somewhat better today.  The intense pain has improved 

but certainly not resolved.  Fevers have improved.  Appetite is improving 

without ongoing nausea or emesis.








Objective





- Vital Signs


Vital signs: 


 Vital Signs











Temp  99.0 F   12/18/18 04:00


 


Pulse  100   12/18/18 04:00


 


Resp  12   12/18/18 04:00


 


BP  119/72   12/18/18 04:00


 


Pulse Ox  96   12/17/18 16:00








 Intake & Output











 12/17/18 12/17/18 12/18/18





 06:59 18:59 06:59


 


Intake Total 1500 560 120


 


Output Total 660 1300 300


 


Balance 840 -740 -180


 


Weight 76 kg  76 kg


 


Intake:   


 


  IV 1200 200 0


 


    Sodium Chloride 0.9% 1, 1200 200 0





    000 ml @ 100 mls/hr IV .   





    Q10H [facility] Rx#:[id number]   


 


  Intake, IV Titration 300 160 





  Amount   


 


    Magnesium Sulfate-D5w Pmx 300  





    1 gm In Dextrose/Water 1   





    100ml.bag @ 100 mls/hr   





    IVPB Q1H [facility] Rx#:   





    [id number]   


 


    cefTRIAXone 2,000 mg In  160 





    Sodium Chloride 0.9% 100   





    ml @ 100 mls/hr IVPB   





    Q24HR [facility] Rx#:[id number]   


 


  Oral  200 120


 


Output:   


 


  Urine 660 1300 300


 


Other:   


 


  # Voids  1 1


 


  # Bowel Movements 1  














- Constitutional


Constitutional Comment(s): 





Pleasant 38-year-old woman still very uncomfortable due to the pain and 

swelling of her right leg.  Fever has resolved hypotension improved


HEENT: Anicteric conjunctiva are pink and moist nasal mucosa grossly intact 

without significant lesions, there is no thrush.


Neck: The neck is supple without significant lymphadenopathy or thyromegaly.


Lungs: Good bilateral air entry without significant crackles or wheezing.  

There is no significant bronchial sounds.  There is no egophony or dullness.


Heart: Regular rate and rhythm with an audible S1-S2, no S3 no S4.  There is no 

significant murmur click or rub, PMI was nondisplaced.


Abdomen: Positive bowel sounds soft and nontender without palpable masses or 

organomegaly.  There was no guarding or rebound.


Extremities: The upper extremities have excellent pulses they are symmetric, no 

significant petechiae or telangiectasia.  No splinter hemorrhages were noted.  


The left lower extremity shows no acute abnormalities or significant edema.  

Right lower extremities shows evidence of some improvement to the erythema and 

edema that goes from the foot all the way to the fold of the thigh.  There is 

swelling and some improvement of the severe discomfort to the right inguinal 

lymph nodes.  No open ulcerations or blisters are seen just a dense erythema 

that is quite tender.  The interspaces show evidence of the thickened white 

material but does appear to be fungal in nature.


Neuro: Awake alert oriented to person place and time.  There are no acute new 

gross focal sensory motor deficits.








- Labs


CBC & Chem 7: 


 12/18/18 04:49





 12/18/18 04:49


Labs: 


 Abnormal Lab Results - Last 24 Hours (Table)











  12/17/18 12/17/18 12/18/18 Range/Units





  04:56 16:58 04:49 


 


RBC    3.12 L  (3.80-5.40)  m/uL


 


Hgb    10.0 L  (11.4-16.0)  gm/dL


 


Hct    30.7 L  (34.0-46.0)  %


 


Lymphocytes #    0.4 L  (1.0-4.8)  k/uL


 


Sodium  136 L    (137-145)  mmol/L


 


Chloride  114 H    ()  mmol/L


 


Carbon Dioxide  18 L    (22-30)  mmol/L


 


POC Glucose (mg/dL)   106 H   (75-99)  mg/dL


 


Calcium  6.9 L    (8.4-10.2)  mg/dL


 


Magnesium  2.8 H    (1.6-2.3)  mg/dL














  12/18/18 Range/Units





  04:49 


 


RBC   (3.80-5.40)  m/uL


 


Hgb   (11.4-16.0)  gm/dL


 


Hct   (34.0-46.0)  %


 


Lymphocytes #   (1.0-4.8)  k/uL


 


Sodium  135 L  (137-145)  mmol/L


 


Chloride  109 H  ()  mmol/L


 


Carbon Dioxide  21 L  (22-30)  mmol/L


 


POC Glucose (mg/dL)   (75-99)  mg/dL


 


Calcium  7.3 L  (8.4-10.2)  mg/dL


 


Magnesium  2.4 H  (1.6-2.3)  mg/dL








 Microbiology - Last 24 Hours (Table)











 12/16/18 22:25 Blood Culture - Preliminary





 Blood    No Growth after 24 hours


 


 12/15/18 16:32 Blood Culture Gram Stain - Preliminary





 Blood Blood Culture - Preliminary





    Strep pyogenes (grp a)








 Microbiology





12/16/18 22:25   Blood   Blood Culture - Preliminary


                            No Growth after 24 hours


12/15/18 16:32   Blood   Blood Culture Gram Stain - Preliminary


12/15/18 16:32   Blood   Blood Culture - Preliminary


                            Strep pyogenes (grp a)


12/15/18 16:32   Blood   Blood Culture Gram Stain - Preliminary


12/16/18 19:00   Skin Scrapings   Skin Fungal Culture - Preliminary


12/15/18 16:12   Urine,Clean Catch   Urine Culture - Final


12/15/18 16:32   Blood   Blood Culture - Final











Assessment and Plan


(1) Cellulitis of right leg


Narrative/Plan: 


38-year-old woman presents to Hospital feeling acutely ill with high-grade 

fever chills and rigors associated with the significant pain and swelling to 

the right lower extremity.  No new acute lesions or injuries of occurred but 

she has the chronic abnormality to the skin and interspaces of her toes which 

appears to be the portal of entry.  She is evidence of significant 

streptococcal sepsis coming from that site with the cellulitis ascending 

lymphangitis and lymphadenopathy.  She has severe sepsis as noted by the 

elevated lactic acid, and the significant infection to the right leg and 

bacteremia.  Antimicrobial therapy will be tailored as we have further data, 

she originally had streptococcal infection in the past.  We'll de-escalate 

antibiotic therapy as soon as possible.  Follow blood cultures will be 

requested.  She is having relative hypotension and will add in tramadol for 

some pain control with Tylenol.  Elevate the limb while at rest.  Topical 

antifungal therapy will be utilized to the interspace and systemic fluconazole 

his early been added.  Should have further evaluation in the outpatient setting 

regarding the abnormality to the foot.


12/17/2018 patient is having some improvement in the last day.  Still very 

uncomfortable but swelling erythema are improving and the intensity of the pain 

has lessened.  She's no longer having high-grade fevers.  There is some 

improvement and Streptococcus pyogenes  is isolated as it has been in the past.

  Continue antibiotic therapy with ceftriaxone and the antifungal therapy.  

Pain control is much improved today.  Continue elevating the limb she cannot 

tolerate compression.


Current Visit: Yes   Status: Acute   Code(s): L03.115 - CELLULITIS OF RIGHT 

LOWER LIMB   SNOMED Code(s): 671134418


   





(2) Acute lymphangitis of right lower extremity


Current Visit: Yes   Status: Acute   Code(s): L03.125 - ACUTE LYMPHANGITIS OF 

RIGHT LOWER LIMB   SNOMED Code(s): 7156580


   





(3) Sepsis due to Streptococcus pyogenes


Current Visit: Yes   Status: Acute   Code(s): A40.0 - SEPSIS DUE TO 

STREPTOCOCCUS, GROUP A   SNOMED Code(s): 469922977

## 2018-12-18 NOTE — P.PN
Subjective


Progress Note Date: 12/18/18


Principal diagnosis: 


 Acute sepsis secondary to right lower extremity cellulitis, with strep a 

bacteremia





This is a 38-year-old female with history of chronic cellulitis involving the 

right lower extremity.  History of sepsis and bacteremia secondary to 

Streptococcus. pyogenes, history of chronic cellulitis and possible fungal 

infection of the interdigital area of toes.  History of multiple medical 

problems including migraine cephalgia, cervical cancer, GERD, hoarse voice, and 

sometimes difficulty swallowing.  Patient presented to the ER last night with 

mostly symptoms of one day history of right lower extremity pain, swelling, 

tenderness, and fever as high as 101.3.  Clearly upon presentation the patient 

was septic she had elevated lactic acid, she was hypotensive, had leukocytosis, 

initial lactic acid was 4.8.  Patient received 3 L of fluid boluses, finally 

responded to fluid boluses, did not require placement on norepinephrine.  I was 

made aware of this patient last night, and recommended admission to the ICU.  I 

did recommend vancomycin, initially she was given in addition to vancomycin and 

cefepime, however I switch her today to a Rocephin after reviewing the previous 

microbiology on this patient from the last admission.  Patient was seen in the 

past by Dr. Pena, and she had a similar presentation not too long ago.  Her 

last evaluation by Dr. Pena was on 7/1/2018.  According to his note, the 

patient had multiple evaluations in the past for cellulitis involving the right 

lower extremity.  At her initial episode was when she was 16 years old.  

According to his previous note, the patient had chronic skin condition that 

causes skin breakdown, and she develops fungal infection, and then she also 

develops invasive bacterial infection.  In the past she has been treated on 

outpatient basis with Keflex and Diflucan.





On 12/17/2018 patient seen in follow-up in the intensive care unit.  She is 

awake and alert, in no acute distress, right lower leg is still quite swollen, 

tender to touch and warm.  Patient is hemodynamically stable, T-max in the last 

24 hours was 102.7F.  Saturations on 3 L per nasal cannula is 96%, sinus 

rhythm on the monitor, the controlled rate.  Blood cultures showed strep 

pyogenes in the blood cultures.  Patient is currently on a combination of 

Rocephin and vancomycin.  Dopplers of the right lower extremity was negative, 

and the study was suboptimal he did not show any convincing evidence of acute 

DVT.  There were abnormal right groin lymph nodes noted greater than 1 cm, 

could be related to infection or inflammation.  Today's chest x-ray was reviewed

, and shows is consistent with fluid overload, pleural effusions and central 

vascular congestion.  Today's labs have been reviewed, WBCs 10.3, hemoglobin is 

10.6, sodium is 136, potassium is 3.8, chloride is 114, CO2 is 18, and BUN was 

13 and creatinine 0.73. 





On 12/18/2018 patient seen in follow-up in the intensive care unit, she is 

awaiting a bed on the general medical floor.  She is awake and alert, in no 

acute distress, she is on 3 L per nasal cannula her pulse ox is 96%, he is 

still having intermittent low-grade fevers, with a T-max of 100.2F.  Denies 

any difficulty breathing, is complaining of moderate headache, denies any 

visual changes, she states her current tramadol is not helping, had an episode 

of nausea and emesis yesterday, nothing today.  Today's blood work has been 

reviewed, WBC is 7.4, hemoglobin is 10.0, sodium is 134, potassium is 3.5, 

chloride is 109, CO2 is 21, BUN is 9, creatinine 0.70.  Yesterday we begin the 

patient on dose of IV Lasix, and there is improvement in the appearance of 

generalized edema, right lower extremity edema, however there is still some 

swelling and redness and warmth to the right lower extremity particularly to 

the right groin area.  No chest x-ray today.  The intense pain to the right leg 

has improved.  Appetite has improved, fever pattern is improving.  He service 

is following, and patient is on Rocephin, Diflucan. 











Objective





- Vital Signs


Vital signs: 


 Vital Signs











Temp  99.0 F   12/18/18 04:00


 


Pulse  100   12/18/18 04:00


 


Resp  12   12/18/18 04:00


 


BP  119/72   12/18/18 04:00


 


Pulse Ox  96   12/18/18 08:29








 Intake & Output











 12/17/18 12/18/18 12/18/18





 18:59 06:59 18:59


 


Intake Total 560 120 


 


Output Total 1300 300 


 


Balance -740 -180 


 


Weight  76 kg 


 


Intake:   


 


   0 


 


    Sodium Chloride 0.9% 1, 200 0 





    000 ml @ 100 mls/hr IV .   





    Q10H [facility] Rx#:[id number]   


 


  Intake, IV Titration 160  





  Amount   


 


    cefTRIAXone 2,000 mg In 160  





    Sodium Chloride 0.9% 100   





    ml @ 100 mls/hr IVPB   





    Q24HR [facility] Rx#:[id number]   


 


  Oral 200 120 


 


Output:   


 


  Urine 1300 300 


 


Other:   


 


  # Voids 1 1 














- Exam


GENERAL: Revealed a 38-year-old female in no distress, very pleasant.


HEENT: Pupils are round and equally reacting to light. EOMI. No scleral 

icterus. No conjunctival pallor. Normocephalic, atraumatic. No pharyngeal 

erythema. No thyromegaly. 


CARDIOVASCULAR: S1 and S2 present. No murmurs, rubs, or gallops. 


PULMONARY: Clear throughout, no crackles or rhonchi or wheezes, no chest wall 

tenderness.


ABDOMEN: Soft, nontender, nondistended, normoactive bowel sounds. No palpable 

organomegaly. 


MUSCULOSKELETAL: No joint swelling or deformity.


EXTREMITIES: Swelling, erythema, tenderness over the right lower extremity 

extending from the groin area down to the ankle top of foot.  Palpable distal 

pulses.  Patient has nonpitting swelling of the left upper extremity due to IV 

fluid infiltration, improved on today's exam.


NEUROLOGICAL: Gross neurological examination did not reveal any focal deficits. 


SKIN: Significant  breaking of the skin noted in the interdigital area between 

between the toes.  Of the right foot.


Psychiatric: Normal mood, affect and mental status examination.








- Labs


CBC & Chem 7: 


 12/18/18 04:49





 12/18/18 04:49


Labs: 


 Abnormal Lab Results - Last 24 Hours (Table)











  12/17/18 12/18/18 12/18/18 Range/Units





  16:58 04:49 04:49 


 


RBC   3.12 L   (3.80-5.40)  m/uL


 


Hgb   10.0 L   (11.4-16.0)  gm/dL


 


Hct   30.7 L   (34.0-46.0)  %


 


Lymphocytes #   0.4 L   (1.0-4.8)  k/uL


 


Sodium    135 L  (137-145)  mmol/L


 


Chloride    109 H  ()  mmol/L


 


Carbon Dioxide    21 L  (22-30)  mmol/L


 


POC Glucose (mg/dL)  106 H    (75-99)  mg/dL


 


Calcium    7.3 L  (8.4-10.2)  mg/dL


 


Magnesium    2.4 H  (1.6-2.3)  mg/dL








 Microbiology - Last 24 Hours (Table)











 12/15/18 16:32 Blood Culture Gram Stain - Preliminary





 Blood Blood Culture - Preliminary





    Strep pyogenes (grp a)


 


 12/16/18 22:25 Blood Culture - Preliminary





 Blood    No Growth after 24 hours


 


 12/15/18 16:32 Blood Culture Gram Stain - Preliminary





 Blood Blood Culture - Preliminary





    Strep pyogenes (grp a)














Assessment and Plan


Plan: 


 1 acute sepsis secondary to right lower extremity cellulitis, most likely 

streptococcal in nature, however considering the presentation, patient will be 

started on vancomycin and Rocephin.  Empirically until the final cultures are 

available.





On 12/18/2018 patient's fever pattern is improving, still running some low-

grade intermittent fevers, the swelling and tenderness in the right lower 

extremity is improving, currently on a combination of Rocephin, fluconazole, 

and patient is receiving Lotrimin cream topically to the right foot.





2 acute bacteremia secondary to Streptococcus.pyogenes group a, the source is 

the right lower extremity unless for otherwise.





3 acute cellulitis of the right lower extremity





4 interdigital fungal infection, patient was already started on Diflucan, local 

antifungal's maybe use, however with a fair recommendation to infectious 

disease on the case.





5 nicotine dependence syndrome, patient was already counseled.





6 history of migraine cephalgia





7 history of GERD.





Plan:





Continue with of antibiotics per ID service recommendation, overall generalized 

edema has improved, right lower extremity pain, and swelling is improved.  She 

is awaiting a bed on general medical floor.  no difficulty breathing, no chest 

pain,vital signs are stable.


I performed a history & physical examination of the patient and discussed their 

management with my nurse practitioner, Sharmila Cast.  I reviewed the nurse 

practitioner's note and agree with the documented findings and plan of care.  

Lung sounds are clear.  The findings and the impression was discussed with the 

patient.  I attest to the documentation by the nurse practitioner. 





Time with Patient: Less than 30

## 2018-12-19 LAB
ANION GAP SERPL CALC-SCNC: 5 MMOL/L
BASOPHILS # BLD AUTO: 0 K/UL (ref 0–0.2)
BASOPHILS NFR BLD AUTO: 0 %
BUN SERPL-SCNC: 7 MG/DL (ref 7–17)
CALCIUM SPEC-MCNC: 7.9 MG/DL (ref 8.4–10.2)
CHLORIDE SERPL-SCNC: 109 MMOL/L (ref 98–107)
CO2 SERPL-SCNC: 23 MMOL/L (ref 22–30)
EOSINOPHIL # BLD AUTO: 0.2 K/UL (ref 0–0.7)
EOSINOPHIL NFR BLD AUTO: 3 %
ERYTHROCYTE [DISTWIDTH] IN BLOOD BY AUTOMATED COUNT: 3.09 M/UL (ref 3.8–5.4)
ERYTHROCYTE [DISTWIDTH] IN BLOOD: 13.5 % (ref 11.5–15.5)
GLUCOSE SERPL-MCNC: 94 MG/DL (ref 74–99)
HCT VFR BLD AUTO: 29.9 % (ref 34–46)
HGB BLD-MCNC: 9.9 GM/DL (ref 11.4–16)
LYMPHOCYTES # SPEC AUTO: 0.4 K/UL (ref 1–4.8)
LYMPHOCYTES NFR SPEC AUTO: 7 %
MAGNESIUM SPEC-SCNC: 2.1 MG/DL (ref 1.6–2.3)
MCH RBC QN AUTO: 32.2 PG (ref 25–35)
MCHC RBC AUTO-ENTMCNC: 33.2 G/DL (ref 31–37)
MCV RBC AUTO: 96.9 FL (ref 80–100)
MONOCYTES # BLD AUTO: 0.3 K/UL (ref 0–1)
MONOCYTES NFR BLD AUTO: 5 %
NEUTROPHILS # BLD AUTO: 4.9 K/UL (ref 1.3–7.7)
NEUTROPHILS NFR BLD AUTO: 82 %
PLATELET # BLD AUTO: 240 K/UL (ref 150–450)
POTASSIUM SERPL-SCNC: 3.9 MMOL/L (ref 3.5–5.1)
SODIUM SERPL-SCNC: 137 MMOL/L (ref 137–145)
WBC # BLD AUTO: 6 K/UL (ref 3.8–10.6)

## 2018-12-19 RX ADMIN — SODIUM CHLORIDE SCH MLS/HR: 900 INJECTION, SOLUTION INTRAVENOUS at 08:43

## 2018-12-19 RX ADMIN — FLUCONAZOLE SCH MG: 100 TABLET ORAL at 08:44

## 2018-12-19 RX ADMIN — CLOTRIMAZOLE SCH APPLIC: 0.01 CREAM TOPICAL at 02:40

## 2018-12-19 RX ADMIN — CLOTRIMAZOLE SCH APPLIC: 0.01 CREAM TOPICAL at 08:44

## 2018-12-19 RX ADMIN — ONDANSETRON PRN MG: 2 INJECTION INTRAMUSCULAR; INTRAVENOUS at 08:43

## 2018-12-19 RX ADMIN — HEPARIN SODIUM SCH UNIT: 5000 INJECTION, SOLUTION INTRAVENOUS; SUBCUTANEOUS at 08:43

## 2018-12-19 RX ADMIN — PANTOPRAZOLE SODIUM SCH MG: 40 TABLET, DELAYED RELEASE ORAL at 06:45

## 2018-12-19 RX ADMIN — ACETAMINOPHEN PRN MG: 325 TABLET, FILM COATED ORAL at 08:43

## 2018-12-19 RX ADMIN — HEPARIN SODIUM SCH UNIT: 5000 INJECTION, SOLUTION INTRAVENOUS; SUBCUTANEOUS at 22:27

## 2018-12-19 RX ADMIN — CLOTRIMAZOLE SCH APPLIC: 0.01 CREAM TOPICAL at 22:27

## 2018-12-19 NOTE — P.PN
Subjective


Progress Note Date: 12/19/18


Principal diagnosis: 


 Acute sepsis secondary to right lower extremity cellulitis, with strep a 

bacteremia





This is a 38-year-old female with history of chronic cellulitis involving the 

right lower extremity.  History of sepsis and bacteremia secondary to 

Streptococcus. pyogenes, history of chronic cellulitis and possible fungal 

infection of the interdigital area of toes.  History of multiple medical 

problems including migraine cephalgia, cervical cancer, GERD, hoarse voice, and 

sometimes difficulty swallowing.  Patient presented to the ER last night with 

mostly symptoms of one day history of right lower extremity pain, swelling, 

tenderness, and fever as high as 101.3.  Clearly upon presentation the patient 

was septic she had elevated lactic acid, she was hypotensive, had leukocytosis, 

initial lactic acid was 4.8.  Patient received 3 L of fluid boluses, finally 

responded to fluid boluses, did not require placement on norepinephrine.  I was 

made aware of this patient last night, and recommended admission to the ICU.  I 

did recommend vancomycin, initially she was given in addition to vancomycin and 

cefepime, however I switch her today to a Rocephin after reviewing the previous 

microbiology on this patient from the last admission.  Patient was seen in the 

past by Dr. Pena, and she had a similar presentation not too long ago.  Her 

last evaluation by Dr. Pena was on 7/1/2018.  According to his note, the 

patient had multiple evaluations in the past for cellulitis involving the right 

lower extremity.  At her initial episode was when she was 16 years old.  

According to his previous note, the patient had chronic skin condition that 

causes skin breakdown, and she develops fungal infection, and then she also 

develops invasive bacterial infection.  In the past she has been treated on 

outpatient basis with Keflex and Diflucan.





On 12/17/2018 patient seen in follow-up in the intensive care unit.  She is 

awake and alert, in no acute distress, right lower leg is still quite swollen, 

tender to touch and warm.  Patient is hemodynamically stable, T-max in the last 

24 hours was 102.7F.  Saturations on 3 L per nasal cannula is 96%, sinus 

rhythm on the monitor, the controlled rate.  Blood cultures showed strep 

pyogenes in the blood cultures.  Patient is currently on a combination of 

Rocephin and vancomycin.  Dopplers of the right lower extremity was negative, 

and the study was suboptimal he did not show any convincing evidence of acute 

DVT.  There were abnormal right groin lymph nodes noted greater than 1 cm, 

could be related to infection or inflammation.  Today's chest x-ray was reviewed

, and shows is consistent with fluid overload, pleural effusions and central 

vascular congestion.  Today's labs have been reviewed, WBCs 10.3, hemoglobin is 

10.6, sodium is 136, potassium is 3.8, chloride is 114, CO2 is 18, and BUN was 

13 and creatinine 0.73. 





On 12/18/2018 patient seen in follow-up in the intensive care unit, she is 

awaiting a bed on the general medical floor.  She is awake and alert, in no 

acute distress, she is on 3 L per nasal cannula her pulse ox is 96%, he is 

still having intermittent low-grade fevers, with a T-max of 100.2F.  Denies 

any difficulty breathing, is complaining of moderate headache, denies any 

visual changes, she states her current tramadol is not helping, had an episode 

of nausea and emesis yesterday, nothing today.  Today's blood work has been 

reviewed, WBC is 7.4, hemoglobin is 10.0, sodium is 134, potassium is 3.5, 

chloride is 109, CO2 is 21, BUN is 9, creatinine 0.70.  Yesterday we begin the 

patient on dose of IV Lasix, and there is improvement in the appearance of 

generalized edema, right lower extremity edema, however there is still some 

swelling and redness and warmth to the right lower extremity particularly to 

the right groin area.  No chest x-ray today.  The intense pain to the right leg 

has improved.  Appetite has improved, fever pattern is improving.  He service 

is following, and patient is on Rocephin, Diflucan. 





On 12/19/2018 patient seen again in follow-up in the intensive care unit, she 

is awaiting a bed on general medical floor.  Willing and tenderness in her 

right lower leg is improving, she was able to tolerate weightbearing on the 

right leg, she is on room air, she is afebrile, hemodynamically stable.  Follow-

up cultures were negative.  Today's lab work has been reviewed.  ID service is 

on in managing the antibiotics.  Fever pattern has improved.  Patient's biggest 

complaint today is persistent headache, patient had a dose of IV Dilaudid, and 

developed some nausea. 











Objective





- Vital Signs


Vital signs: 


 Vital Signs











Temp  98.4 F   12/19/18 05:00


 


Pulse  80   12/19/18 05:00


 


Resp  12   12/19/18 05:00


 


BP  122/66   12/19/18 05:00


 


Pulse Ox  93 L  12/19/18 00:00








 Intake & Output











 12/18/18 12/19/18 12/19/18





 18:59 06:59 18:59


 


Intake Total 700 240 


 


Output Total 900 750 


 


Balance -200 -510 


 


Weight  69.4 kg 


 


Intake:   


 


  Intake, IV Titration 100 0 





  Amount   


 


    cefTRIAXone 2,000 mg In 100 0 





    Sodium Chloride 0.9% 100   





    ml @ 100 mls/hr IVPB   





    Q24HR [facility] Rx#:[id number]   


 


  Oral 600 240 


 


Output:   


 


  Urine 900 750 


 


Other:   


 


  # Voids  0 














- Exam


GENERAL: Revealed a 38-year-old female in no distress, very pleasant.


HEENT: Pupils are round and equally reacting to light. EOMI. No scleral 

icterus. No conjunctival pallor. Normocephalic, atraumatic. No pharyngeal 

erythema. No thyromegaly. 


CARDIOVASCULAR: S1 and S2 present. No murmurs, rubs, or gallops. 


PULMONARY: Clear throughout, no crackles or rhonchi or wheezes, no chest wall 

tenderness.


ABDOMEN: Soft, nontender, nondistended, normoactive bowel sounds. No palpable 

organomegaly. 


MUSCULOSKELETAL: No joint swelling or deformity.


EXTREMITIES: Swelling, erythema, tenderness over the right lower extremity 

extending from the groin area down to the ankle top of foot.  Palpable distal 

pulses.  Patient has nonpitting swelling of the left upper extremity due to IV 

fluid infiltration, improved on today's exam.


NEUROLOGICAL: Gross neurological examination did not reveal any focal deficits. 


SKIN: Significant  breaking of the skin noted in the interdigital area between 

between the toes.  Of the right foot.


Psychiatric: Normal mood, affect and mental status examination.








- Labs


CBC & Chem 7: 


 12/19/18 04:53





 12/19/18 04:53


Labs: 


 Abnormal Lab Results - Last 24 Hours (Table)











  12/19/18 12/19/18 Range/Units





  04:53 04:53 


 


RBC  3.09 L   (3.80-5.40)  m/uL


 


Hgb  9.9 L   (11.4-16.0)  gm/dL


 


Hct  29.9 L   (34.0-46.0)  %


 


Lymphocytes #  0.4 L   (1.0-4.8)  k/uL


 


Chloride   109 H  ()  mmol/L


 


Calcium   7.9 L  (8.4-10.2)  mg/dL








 Microbiology - Last 24 Hours (Table)











 12/16/18 22:25 Blood Culture - Preliminary





 Blood    No Growth after 48 hours


 


 12/15/18 16:32 Blood Culture Gram Stain - Preliminary





 Blood Blood Culture - Preliminary





    Strep pyogenes (grp a)














Assessment and Plan


Plan: 


 1 acute sepsis secondary to right lower extremity cellulitis, most likely 

streptococcal in nature, however considering the presentation, patient will be 

started on vancomycin and Rocephin.  Empirically until the final cultures are 

available.





On 12/18/2018 patient's fever pattern is improving, still running some low-

grade intermittent fevers, the swelling and tenderness in the right lower 

extremity is improving, currently on a combination of Rocephin, fluconazole, 

and patient is receiving Lotrimin cream topically to the right foot.





2 acute bacteremia secondary to Streptococcus.pyogenes group a, the source is 

the right lower extremity unless for otherwise.





3 acute cellulitis of the right lower extremity





4 interdigital fungal infection, patient was already started on Diflucan, local 

antifungal's maybe use, however with a fair recommendation to infectious 

disease on the case.





5 nicotine dependence syndrome, patient was already counseled.





6 history of migraine cephalgia





7 history of GERD.





Plan:





Continue events per ID service recommendations, fever pattern in the swelling 

and tenderness in the right lower extremity is improving.  Follow blood 

cultures are negative so far.  Patient is having persistent headache, and some 

nausea.  Otherwise no other events overnight, we'll continue to follow, 

awaiting a bed in general medical floor. 





I performed a history & physical examination of the patient and discussed their 

management with my nurse practitioner, Sharmila Cast.  I reviewed the nurse 

practitioner's note and agree with the documented findings and plan of care.  

Lung sounds are clear.  The findings and the impression was discussed with the 

patient.  I attest to the documentation by the nurse practitioner. 





Time with Patient: Less than 30

## 2018-12-20 VITALS — DIASTOLIC BLOOD PRESSURE: 74 MMHG | TEMPERATURE: 98.3 F | HEART RATE: 83 BPM | SYSTOLIC BLOOD PRESSURE: 124 MMHG

## 2018-12-20 VITALS — RESPIRATION RATE: 16 BRPM

## 2018-12-20 LAB
ANION GAP SERPL CALC-SCNC: 5 MMOL/L
BASOPHILS # BLD AUTO: 0 K/UL (ref 0–0.2)
BASOPHILS NFR BLD AUTO: 0 %
BUN SERPL-SCNC: 5 MG/DL (ref 7–17)
CALCIUM SPEC-MCNC: 8.7 MG/DL (ref 8.4–10.2)
CHLORIDE SERPL-SCNC: 108 MMOL/L (ref 98–107)
CO2 SERPL-SCNC: 27 MMOL/L (ref 22–30)
EOSINOPHIL # BLD AUTO: 0.2 K/UL (ref 0–0.7)
EOSINOPHIL NFR BLD AUTO: 3 %
ERYTHROCYTE [DISTWIDTH] IN BLOOD BY AUTOMATED COUNT: 3.48 M/UL (ref 3.8–5.4)
ERYTHROCYTE [DISTWIDTH] IN BLOOD: 13.4 % (ref 11.5–15.5)
GLUCOSE SERPL-MCNC: 94 MG/DL (ref 74–99)
HCT VFR BLD AUTO: 33.6 % (ref 34–46)
HGB BLD-MCNC: 11 GM/DL (ref 11.4–16)
LYMPHOCYTES # SPEC AUTO: 0.7 K/UL (ref 1–4.8)
LYMPHOCYTES NFR SPEC AUTO: 14 %
MAGNESIUM SPEC-SCNC: 2 MG/DL (ref 1.6–2.3)
MCH RBC QN AUTO: 31.7 PG (ref 25–35)
MCHC RBC AUTO-ENTMCNC: 32.8 G/DL (ref 31–37)
MCV RBC AUTO: 96.5 FL (ref 80–100)
MONOCYTES # BLD AUTO: 0.4 K/UL (ref 0–1)
MONOCYTES NFR BLD AUTO: 7 %
NEUTROPHILS # BLD AUTO: 3.7 K/UL (ref 1.3–7.7)
NEUTROPHILS NFR BLD AUTO: 73 %
PLATELET # BLD AUTO: 305 K/UL (ref 150–450)
POTASSIUM SERPL-SCNC: 4.2 MMOL/L (ref 3.5–5.1)
SODIUM SERPL-SCNC: 140 MMOL/L (ref 137–145)
WBC # BLD AUTO: 5 K/UL (ref 3.8–10.6)

## 2018-12-20 RX ADMIN — HEPARIN SODIUM SCH UNIT: 5000 INJECTION, SOLUTION INTRAVENOUS; SUBCUTANEOUS at 08:41

## 2018-12-20 RX ADMIN — SODIUM CHLORIDE SCH MLS/HR: 900 INJECTION, SOLUTION INTRAVENOUS at 08:41

## 2018-12-20 RX ADMIN — CLOTRIMAZOLE SCH APPLIC: 0.01 CREAM TOPICAL at 08:42

## 2018-12-20 RX ADMIN — FLUCONAZOLE SCH MG: 100 TABLET ORAL at 08:41

## 2018-12-20 RX ADMIN — PANTOPRAZOLE SODIUM SCH MG: 40 TABLET, DELAYED RELEASE ORAL at 08:41

## 2018-12-20 NOTE — P.PN
Subjective


Progress Note Date: 12/19/18


Progress note being dictated for Dr. Rdz.











Interval history: This a 38-year-old female admitted with acute cellulitis of 

the right leg, severe sepsis, septic shock, hypotension and multiple other 

medical issues.  Maintained on broad-spectrum IV antibiotics as per infectious 

disease.   T-max 102.7,normal WBC.  Chest x-ray reporting progressive bilateral 

consolidation/pleural effusion, fluid overload, central vascular congestion.  

Received a dose of Lasix IV push. Maintaining O2 sats of 96% on 3 L nasal 

cannula.  Improving diet intake.











12/18/18 MedSurg overflow.  Complains of headache; attributed to infection.  

Receiving potassium supplements.  Right leg edema and pain continues to 

improve.  Maintained on Diflucan, Rocephin.Fevers improving, T-max 100, WBC 

7.4.  O2 sat 92% on room air.








12/19/18 maintained on IV antibiotics with continued improvement in right leg 

edema, tenderness.  Previously unable to tolerate weightbearing of the right leg

, but today less pain with standing.  Afebrile currently, T-max 100.  Headache 

improving. Cultures negative.
























































Objective





- Vital Signs


Vital signs: 


 Vital Signs











Temp  98.8 F   12/19/18 17:22


 


Pulse  82   12/19/18 17:22


 


Resp  16   12/19/18 17:22


 


BP  129/82   12/19/18 17:22


 


Pulse Ox  92 L  12/19/18 17:22








 Intake & Output











 12/19/18 12/19/18 12/20/18





 06:59 18:59 06:59


 


Intake Total 240 340 


 


Output Total 750 1140 


 


Balance -510 -800 


 


Weight 69.4 kg  


 


Intake:   


 


  Intake, IV Titration 0 100 





  Amount   


 


    cefTRIAXone 2,000 mg In 0 100 





    Sodium Chloride 0.9% 100   





    ml @ 100 mls/hr IVPB   





    Q24HR [facility] Rx#:[id number]   


 


  Oral 240 240 


 


Output:   


 


  Urine 750 1140 


 


Other:   


 


  # Voids 0 0 














- Exam


VITAL SIGNS: As above


GENERAL: Sitting up in chair, no acute distress


HEENT:  Conjunctivae normal. eyes normal.  Oral mucosa moist


NECK:  No JVD. No thyroid enlargement. No LNs.


CARDIOVASCULAR:  S1, S2 muffled. No murmur, rub or gallop


RESPIRATION: Respiratory effort normal, Breath sounds diminished in the bases.  

No rhonchi, no crackles, no wheezes.


ABDOMEN:  Soft,  nontender . No guarding. no masses palpable. Bowel sounds 

heard.


LEGS: Decreased swelling,edema,erythema, with mild tenderness of right leg from 

groin to foot.warm .positive pulses.  Right foot with Skin break between the 

toes/great toe


PSYCHIATRY: Alert and oriented -3, mood and affect normal.


NERVOUS SYSTEM:  Cranial N 2-12 grossly normal. Moves all 4 limbs.  Diffuse 

weakness No focal deficits.


Skin: no ulcer no rash.  












































 











- Labs


CBC & Chem 7: 


 12/20/18 08:36





 12/20/18 08:36


Labs: 


 Abnormal Lab Results - Last 24 Hours (Table)











  12/19/18 12/19/18 Range/Units





  04:53 04:53 


 


RBC  3.09 L   (3.80-5.40)  m/uL


 


Hgb  9.9 L   (11.4-16.0)  gm/dL


 


Hct  29.9 L   (34.0-46.0)  %


 


Lymphocytes #  0.4 L   (1.0-4.8)  k/uL


 


Chloride   109 H  ()  mmol/L


 


Calcium   7.9 L  (8.4-10.2)  mg/dL








 Microbiology - Last 24 Hours (Table)











 12/15/18 16:32 Blood Culture Gram Stain - Final





 Blood Blood Culture - Final





    Strep pyogenes (grp a)


 


 12/15/18 16:32 Blood Culture Gram Stain - Final





 Blood Blood Culture - Final





    Strep pyogenes (grp a)


 


 12/16/18 22:25 Blood Culture - Preliminary





 Blood    No Growth after 48 hours














Assessment and Plan


Assessment: 


-Acute cellulitis, acute lymphangitis of the right leg with severe sepsis, 

septic shock


-Acute hypoxic respiratory failure secondary to the above


-Acute bacteremia secondary to Streptococcus pyogenes group a, most likely 

related to right lower extremity cellulitis


- hypotension secondary to sepsis, status post multiple fluid boluses, resolved


-Fungal infection ,interdigital 


-History of recurrent cellulitis of the right side


-History of previous multiple sepsis 


-Ongoing nicotine dependence


-History of migraines














Plan: Continue on current medication regime , Lotrimin cream ,monitoring and 

symptomatic treatment. MedSurg overflow bed pending.  Maintain IV antibiotics 

as per infectious disease.  Repeat blood cultures pending. Prognosis guarded 

given multiple complex medical issues.  Discharge planning in progress for 

tomorrow.




















The impression and plan of care has been dictated as directed.





:


I performed a history and examination of this patient,  discussed the same with 

the dictator.  I agree with the dictator's note ,documented as a scribe.  Any 

additional findings or plans will be noted.

## 2018-12-20 NOTE — P.PN
Subjective


Progress Note Date: 12/18/18


Progress note being dictated for Dr. Rdz.











Interval history: This a 38-year-old female admitted with acute cellulitis of 

the right leg, severe sepsis, septic shock, hypotension and multiple other 

medical issues.  Maintained on broad-spectrum IV antibiotics as per infectious 

disease.   T-max 102.7,normal WBC.  Chest x-ray reporting progressive bilateral 

consolidation/pleural effusion, fluid overload, central vascular congestion.  

Received a dose of Lasix IV push. Maintaining O2 sats of 96% on 3 L nasal 

cannula.  Improving diet intake.











12/18/18 MedSurg overflow.  Complains of headache; attributed to infection.  

Receiving potassium supplements.  Right leg edema and pain continues to 

improve.  Maintained on Diflucan, Rocephin.Fevers improving, T-max 100, WBC 

7.4.  O2 sat 92% on room air.








Review of systems:





CONSTITUTIONAL:  fevers lessening, fatigue. 


HEENT: No recent visual problems or hearing problems. Denied any sore throat.


CARDIOVASCULAR: No chest pain, orthopnea, PND, no palpitations, no syncope. 


PULMONARY: No shortness of breath, no cough, no hemoptysis. 


GASTROINTESTINAL: No diarrhea, no nausea, no vomiting, no abdominal pain. 

Normoactive bowel sounds. 


NEUROLOGICAL: headache- unrelieved by Tylenol, no weakness, no numbness. 


HEMATOLOGICAL: Denies any bleeding or petechiae. 


GENITOURINARY: Denies any burning micturition, frequency, or urgency.  


MUSCULOSKELETAL/RHEUMATOLOGICAL: Increased right leg pain upon standing, 

controlled at rest


ENDOCRINE: Denies any polyuria or polydipsia.


PSYCHIATRIC: No anxiety, no depression





The rest of the 14 point review of systems is negative


















































 Active Medications











Generic Name Dose Route Start Last Admin





  Trade Name Freq  PRN Reason Stop Dose Admin


 


Acetaminophen  650 mg  12/15/18 19:12  12/18/18 17:12





  Tylenol Tab  PO   650 mg





  Q6HR PRN   Administration





  Mild Pain or Fever > 100.5   





     





     





     


 


Acetaminophen/Codeine Phosphate  2 each  12/18/18 11:07  





  Tylenol #3  PO   





  Q6HR PRN   





  Moderate Pain   





     





     





     


 


Benzonatate  200 mg  12/18/18 11:05  





  Tessalon Perles  PO   





  TID PRN   





  Cough   





     





     





     


 


Clotrimazole  1 applic  12/16/18 21:00  12/18/18 11:09





  Lotrimin Cream  TOPICAL   1 applic





  BID [facility]   Administration





     





     





     





     


 


Fluconazole  200 mg  12/16/18 09:00  12/18/18 09:35





  Diflucan  PO   200 mg





  DAILY [facility]   Administration





     





     





     





     


 


Heparin Sodium (Porcine)  5,000 unit  12/16/18 09:00  12/18/18 09:35





  Heparin  SQ   5,000 unit





  Q12HR [facility]   Administration





     





     





     





     


 


Hydromorphone HCl  0.5 mg  12/17/18 12:39  12/17/18 22:23





  Dilaudid  IVP   0.5 mg





  Q4HR PRN   Administration





  Pain   





     





     





     


 


Ceftriaxone Sodium 2,000 mg/  100 mls @ 100 mls/hr  12/16/18 10:00  12/18/18 09:

32





  Sodium Chloride  IVPB   100 mls/hr





  Q24HR [facility]   Administration





     





     





     





     


 


Ibuprofen  400 mg  12/15/18 19:12  12/17/18 22:23





  Motrin  PO   400 mg





  Q6HR PRN   Administration





  Mild Pain or Fever > 100.5   





     





     





     


 


Metoclopramide HCl  5 mg  12/17/18 14:29  12/17/18 15:02





  Reglan  IVP   5 mg





  Q6HR PRN   Administration





  Nausea And Vomiting   





     





     





     


 


Miscellaneous Information  1 each  12/16/18 20:22  





  Magnesium Per Protocol  MISCELLANE   





  DAILY PRN   





  Per Protocol   





     





  Protocol   





     


 


Miscellaneous Information  1 each  12/17/18 06:46  





  Potassium Per Protocol  MISCELLANE   





  DAILY PRN   





  Per Protocol   





     





  Protocol   





     


 


Miscellaneous Information  1 each  12/18/18 06:53  





  Potassium Per Protocol  MISCELLANE   





  DAILY PRN   





  Per Protocol   





     





  Protocol   





     


 


Naloxone HCl  0.2 mg  12/15/18 19:12  





  Narcan  IV   





  Q2M PRN   





  Opioid Reversal   





     





     





     


 


Ondansetron HCl  4 mg  12/17/18 11:55  12/17/18 12:17





  Zofran  IVP   4 mg





  Q6HR PRN   Administration





  Nausea And Vomiting   





     





     





     


 


Pantoprazole Sodium  40 mg  12/19/18 07:30  





  Protonix  PO   





  AC-BRKFST [facility]   





     





     





     





     


 


Tramadol HCl  50 mg  12/16/18 15:58  12/18/18 17:11





  Ultram  PO   50 mg





  QID PRN   Administration





  MODERATE Pain   





     





     





     














Objective





- Vital Signs


Vital signs: 


 Vital Signs











Temp  99.0 F   12/18/18 04:00


 


Pulse  100   12/18/18 04:00


 


Resp  12   12/18/18 04:00


 


BP  119/72   12/18/18 04:00


 


Pulse Ox  96   12/18/18 08:29








 Intake & Output











 12/17/18 12/18/18 12/18/18





 18:59 06:59 18:59


 


Intake Total 560 120 


 


Output Total 1300 300 


 


Balance -740 -180 


 


Weight  76 kg 


 


Intake:   


 


   0 


 


    Sodium Chloride 0.9% 1, 200 0 





    000 ml @ 100 mls/hr IV .   





    Q10H [facility] Rx#:[id number]   


 


  Intake, IV Titration 160  





  Amount   


 


    cefTRIAXone 2,000 mg In 160  





    Sodium Chloride 0.9% 100   





    ml @ 100 mls/hr IVPB   





    Q24HR [facility] Rx#:[id number]   


 


  Oral 200 120 


 


Output:   


 


  Urine 1300 300 


 


Other:   


 


  # Voids 1 1 














- Exam


PHYSICAL EXAM:


VITAL SIGNS: As above


GENERAL: Sitting up in bed, no acute distress


HEENT:  Conjunctivae normal. eyes normal.  Oral mucosa moist


NECK:  No JVD. No thyroid enlargement. No LNs.


CARDIOVASCULAR:  S1, S2 muffled. No murmur, rub or gallop


RESPIRATION: Respiratory effort normal, Breath sounds diminished in the bases.  

No rhonchi, no crackles, no wheezes.


ABDOMEN:  Soft,  nontender . No guarding. no masses palpable. Bowel sounds 

heard.


LEGS: Continued Improvment of swelling,edema,erythema, with decreasing  diffuse 

tenderness of right leg from groin to foot.warm .positive pulses.


PSYCHIATRY: Alert and oriented -3, mood and affect normal.


NERVOUS SYSTEM:  Cranial N 2-12 grossly normal. Moves all 4 limbs.  Diffuse 

weakness No focal deficits.


Skin: no ulcer no rash.  Improving Left upper extremity edema-infiltrated IV.


Lymphatic system. No LN neck, axilla.















































 Microbiology





12/15/18 16:32   Blood   Blood Culture Gram Stain - Final


12/15/18 16:32   Blood   Blood Culture - Final


                            Strep pyogenes (grp a)


12/15/18 16:32   Blood   Blood Culture Gram Stain - Final


12/15/18 16:32   Blood   Blood Culture - Final


                            Strep pyogenes (grp a)


12/16/18 22:25   Blood   Blood Culture - Preliminary


                            No Growth after 48 hours


12/16/18 19:00   Skin Scrapings   Skin Fungal Culture - Preliminary


12/15/18 16:12   Urine,Clean Catch   Urine Culture - Final


12/15/18 16:32   Blood   Blood Culture - Final














- Labs


CBC & Chem 7: 


 12/19/18 04:53





 12/19/18 04:53


Labs: 


 Abnormal Lab Results - Last 24 Hours (Table)











  12/18/18 12/18/18 Range/Units





  04:49 04:49 


 


RBC  3.12 L   (3.80-5.40)  m/uL


 


Hgb  10.0 L   (11.4-16.0)  gm/dL


 


Hct  30.7 L   (34.0-46.0)  %


 


Lymphocytes #  0.4 L   (1.0-4.8)  k/uL


 


Sodium   135 L  (137-145)  mmol/L


 


Chloride   109 H  ()  mmol/L


 


Carbon Dioxide   21 L  (22-30)  mmol/L


 


Calcium   7.3 L  (8.4-10.2)  mg/dL


 


Magnesium   2.4 H  (1.6-2.3)  mg/dL








 Microbiology - Last 24 Hours (Table)











 12/15/18 16:32 Blood Culture Gram Stain - Preliminary





 Blood Blood Culture - Preliminary





    Strep pyogenes (grp a)


 


 12/16/18 22:25 Blood Culture - Preliminary





 Blood    No Growth after 24 hours


 


 12/15/18 16:32 Blood Culture Gram Stain - Preliminary





 Blood Blood Culture - Preliminary





    Strep pyogenes (grp a)














Assessment and Plan


Assessment: 


-Acute cellulitis, acute lymphangitis of the right leg with severe sepsis, 

septic shock


-Acute hypoxic respiratory failure secondary to the above


-Acute bacteremia secondary to Streptococcus pyogenes group a, most likely 

related to right lower extremity cellulitis


- hypotension secondary to sepsis, status post multiple fluid boluses, resolved


-Fungal infection ,interdigital 


-History of recurrent cellulitis of the right side


-History of previous multiple sepsis 


-Ongoing nicotine dependence


-History of migraines














Plan: Continue on current medication regime ,monitoring and symptomatic 

treatment.  Maintain IV antibiotics as per infectious disease.  Tylenol No. 3 

added to med regime for headache not controlled with Tylenol .Smoking cessation 

readdressed .MedSurg overflow, awaiting bed.  Prognosis guarded given multiple 

complex medical issues.




















The impression and plan of care has been dictated as directed.





:


I performed a history and examination of this patient,  discussed the same with 

the dictator.  I agree with the dictator's note ,documented as a scribe.  Any 

additional findings or plans will be noted.

## 2018-12-20 NOTE — P.PN
Subjective


Progress Note Date: 12/17/18














Progress note being dictated for Dr. Rdz.











Interval history: This a 38-year-old female admitted with acute cellulitis of 

the right leg, severe sepsis, septic shock, hypotension and multiple other 

medical issues.  Maintained on broad-spectrum IV antibiotics as per infectious 

disease.   T-max 102.7,normal WBC.  Chest x-ray reporting progressive bilateral 

consolidation/pleural effusion, fluid overload, central vascular congestion.  

Received a dose of Lasix IV push. Maintaining O2 sats of 96% on 3 L nasal 

cannula.  Improving diet intake.








Review of systems:





CONSTITUTIONAL: Positive fevers, fatigue. 


HEENT: No recent visual problems or hearing problems. Denied any sore throat.


CARDIOVASCULAR: No chest pain, orthopnea, PND, no palpitations, no syncope. 


PULMONARY: No shortness of breath, no cough, no hemoptysis. 


GASTROINTESTINAL: No diarrhea, no nausea, no vomiting, no abdominal pain. 

Normoactive bowel sounds. 


NEUROLOGICAL: headache, no weakness, no numbness. 


HEMATOLOGICAL: Denies any bleeding or petechiae. 


GENITOURINARY: Denies any burning micturition, frequency, or urgency.  


MUSCULOSKELETAL/RHEUMATOLOGICAL: Increased right leg pain with standing


ENDOCRINE: Denies any polyuria or polydipsia.


PSYCHIATRIC: No anxiety, no depression





The rest of the 14 point review of systems is negative














Active Medications





Acetaminophen (Tylenol Tab)  650 mg PO Q6HR PRN


   PRN Reason: Mild Pain or Fever > 100.5


   Last Admin: 12/17/18 06:07 Dose:  650 mg


Clotrimazole (Lotrimin Cream)  1 applic TOPICAL BID [facility]


   Last Admin: 12/17/18 08:47 Dose:  1 applic


Fluconazole (Diflucan)  200 mg PO DAILY [facility]


   Last Admin: 12/17/18 08:49 Dose:  200 mg


Heparin Sodium (Porcine) (Heparin)  5,000 unit SQ Q12HR [facility]


   Last Admin: 12/17/18 08:47 Dose:  5,000 unit


Hydromorphone HCl (Dilaudid)  0.5 mg IVP Q4HR PRN


   PRN Reason: Pain


Ceftriaxone Sodium 2,000 mg/ (Sodium Chloride)  100 mls @ 100 mls/hr IVPB Q24HR 

[facility]


   Last Admin: 12/17/18 09:51 Dose:  100 mls/hr


Ibuprofen (Motrin)  400 mg PO Q6HR PRN


   PRN Reason: Mild Pain or Fever > 100.5


   Last Admin: 12/17/18 06:08 Dose:  400 mg


Metoclopramide HCl (Reglan)  5 mg IVP Q6HR PRN


   PRN Reason: Nausea And Vomiting


   Last Admin: 12/17/18 15:02 Dose:  5 mg


Miscellaneous Information (Magnesium Per Protocol)  1 each MISCELLANE DAILY PRN

; Protocol


   PRN Reason: Per Protocol


Miscellaneous Information (Potassium Per Protocol)  1 each MISCELLANE DAILY PRN

; Protocol


   PRN Reason: Per Protocol


Naloxone HCl (Narcan)  0.2 mg IV Q2M PRN


   PRN Reason: Opioid Reversal


Ondansetron HCl (Zofran)  4 mg IVP Q6HR PRN


   PRN Reason: Nausea And Vomiting


   Last Admin: 12/17/18 12:17 Dose:  4 mg


Pantoprazole Sodium (Protonix)  40 mg IV DAILY [facility]


   Last Admin: 12/17/18 08:48 Dose:  40 mg


Tramadol HCl (Ultram)  50 mg PO QID PRN


   PRN Reason: MODERATE Pain


   Last Admin: 12/17/18 07:09 Dose:  50 mg











Objective





- Vital Signs


Vital signs: 


 Vital Signs











Temp  98.6 F   12/17/18 12:00


 


Pulse  86   12/17/18 15:00


 


Resp  34 H  12/17/18 12:00


 


BP  117/71   12/17/18 12:00


 


Pulse Ox  96   12/17/18 12:00








 Intake & Output











 12/16/18 12/17/18 12/17/18





 18:59 06:59 18:59


 


Intake Total 1650 1500 560


 


Output Total 


 


Balance 850 840 -740


 


Weight  76 kg 


 


Intake:   


 


  IV 1200 1200 200


 


    Sodium Chloride 0.9% 1, 1200 1200 200





    000 ml @ 100 mls/hr IV .   





    Q10H [facility] Rx#:[id number]   


 


  Intake, IV Titration 350 300 160





  Amount   


 


    Magnesium Sulfate-D5w Pmx  300 





    1 gm In Dextrose/Water 1   





    100ml.bag @ 100 mls/hr   





    IVPB Q1H [facility] Rx#:   





    [id number]   


 


    Vancomycin 1,250 mg In 250  





    Sodium Chloride 0.9% 250   





    ml @ 125 mls/hr IVPB Q12H   





    [facility] Rx#:[id number]   


 


    cefTRIAXone 2,000 mg In 100  160





    Sodium Chloride 0.9% 100   





    ml @ 100 mls/hr IVPB   





    Q24HR [facility] Rx#:[id number]   


 


  Oral 100  200


 


Output:   


 


  Urine 


 


Other:   


 


  # Voids 1  1


 


  # Bowel Movements 1 1 














- Exam


PHYSICAL EXAM:


VITAL SIGNS: As above


GENERAL: Sitting up in chair, no acute distress


HEENT:  Conjunctivae normal. eyes normal.  Oral mucosa moist


NECK:  No JVD. No thyroid enlargement. No LNs


CARDIOVASCULAR:  S1, S2 muffled. No murmur, rub or gallop


RESPIRATION: Breath sounds diminished in the bases.  No rhonchi, no crackles, 

no wheezes.


ABDOMEN:  Soft,  nontender . No guarding. no masses palpable. Bowel sounds 

heard.


LEGS: Slowly Improving swelling,edema,erythema, with decreasing  diffuse 

tenderness of right leg from groin to foot.warm .positive pulses.


PSYCHIATRY: Alert and oriented -3, mood and affect normal.


NERVOUS SYSTEM:  Cranial N 2-12 grossly normal. Moves all 4 limbs.  Diffuse 

weakness No focal deficits.


Skin: no ulcer no rash.  Left upper extremity edema-infiltrated IV.


Lymphatic system. No LN neck, axilla.















































 Microbiology





12/15/18 16:32   Blood   Blood Culture Gram Stain - Final


12/15/18 16:32   Blood   Blood Culture - Final


                            Strep pyogenes (grp a)


12/15/18 16:32   Blood   Blood Culture Gram Stain - Final


12/15/18 16:32   Blood   Blood Culture - Final


                            Strep pyogenes (grp a)


12/16/18 22:25   Blood   Blood Culture - Preliminary


                            No Growth after 48 hours


12/16/18 19:00   Skin Scrapings   Skin Fungal Culture - Preliminary


12/15/18 16:12   Urine,Clean Catch   Urine Culture - Final


12/15/18 16:32   Blood   Blood Culture - Final














- Labs


CBC & Chem 7: 


 12/19/18 04:53





 12/19/18 04:53


Labs: 


 Abnormal Lab Results - Last 24 Hours (Table)











  12/17/18 12/17/18 Range/Units





  04:56 04:56 


 


RBC  3.30 L   (3.80-5.40)  m/uL


 


Hgb  10.6 L   (11.4-16.0)  gm/dL


 


Hct  33.0 L   (34.0-46.0)  %


 


Neutrophils #  9.7 H   (1.3-7.7)  k/uL


 


Lymphocytes #  0.2 L   (1.0-4.8)  k/uL


 


Sodium   136 L  (137-145)  mmol/L


 


Chloride   114 H  ()  mmol/L


 


Carbon Dioxide   18 L  (22-30)  mmol/L


 


Calcium   6.9 L  (8.4-10.2)  mg/dL


 


Magnesium   2.8 H  (1.6-2.3)  mg/dL








 Microbiology - Last 24 Hours (Table)











 12/15/18 16:32 Blood Culture Gram Stain - Preliminary





 Blood Blood Culture - Preliminary





    Strep pyogenes (grp a)


 


 12/15/18 16:32 Blood Culture Gram Stain - Preliminary





 Blood 


 


 12/16/18 19:00 Skin Fungal Culture - Preliminary





 Skin Scrapings 


 


 12/15/18 16:12 Urine Culture - Final





 Urine,Clean Catch 














Assessment and Plan


Assessment: 


-Acute cellulitis, acute lymphangitis of the right leg with severe sepsis, 

septic shock


-Acute hypoxic respiratory failure secondary to the above


-Acute bacteremia secondary to Streptococcus pyogenes group a


- hypotension secondary to sepsis, resolved


-Fungal infection ,interdigital 


-History of recurrent cellulitis of the right side


-History of previous multiple sepsis 


-Ongoing nicotine dependence


-History of migraines














Plan: Continue on current medication regime ,monitoring and symptomatic 

treatment.  Maintain IV antibiotics as per infectious disease.  Final cultures 

pending.  IV fluids discontinued in addition to a dose of Lasix IV push for 

fluid overload. MedSurg overflow, awaiting bed.  Prognosis guarded given 

multiple complex medical issues.




















The impression and plan of care has been dictated as directed.





:


I performed a history and examination of this patient,  discussed the same with 

the dictator.  I agree with the dictator's note ,documented as a scribe.  Any 

additional findings or plans will be noted.

## 2018-12-20 NOTE — P.DS
Providers


Date of admission: 


12/16/18 00:00





Expected date of discharge: 12/20/18


Attending physician: 


Liane Donis


Consults: 





 





12/15/18 19:12


Consult Physician Stat 


   Consulting Provider: Jhonny Pena


   Consult Reason/Comments: Recurrent cellulitis right lower leg


   Do you want consulting provider notified?: Yes





12/16/18 00:13


Consult Physician Routine 


   Consulting Provider: Gama Rios


   Consult Reason/Comments: Cellulitis left leg


   Do you want consulting provider notified?: Yes











Primary care physician: 


Vanessa Erazo





Hospital Course: 


Final Diagnoses:





-Acute cellulitis, acute lymphangitis of the right leg with severe sepsis, 

septic shock


-Acute hypoxic respiratory failure secondary to the above


-Acute bacteremia secondary to Streptococcus pyogenes group a, most likely 

related to right lower extremity cellulitis


- hypotension secondary to sepsis, status post multiple fluid boluses, resolved


-Fungal infection ,interdigital 


-History of recurrent cellulitis of the right side


-History of previous multiple sepsis 


-Ongoing nicotine dependence


-History of migraines








Hospital course:his a 38-year-old female admitted with acute cellulitis of the 

right leg, severe sepsis, septic shock, hypotension and multiple other medical 

issues.  Maintained on broad-spectrum IV antibiotics as per infectious disease.

   T-max 102.7,normal WBC.  Chest x-ray reporting progressive bilateral 

consolidation/pleural effusion, fluid overload, central vascular congestion.  

Received a dose of Lasix IV push. Maintaining O2 sats of 96% on 3 L nasal 

cannula.  Improving diet intake.  Evaluated by both pulmonary, infectious 

disease.  Significant clinical improvement.  Patient has been cleared by all 

consults for discharge.  Patient is being discharged home in a stable condition 

with guarded prognosis.





 Microbiology





12/15/18 16:32   Blood   Blood Culture Gram Stain - Final


12/15/18 16:32   Blood   Blood Culture - Final


                            Strep pyogenes (grp a)


12/16/18 22:25   Blood   Blood Culture - Preliminary


                            No Growth after 72 hours


12/15/18 16:32   Blood   Blood Culture Gram Stain - Final


12/15/18 16:32   Blood   Blood Culture - Final


                            Strep pyogenes (grp a)


12/16/18 19:00   Skin Scrapings   Skin Fungal Culture - Preliminary


12/15/18 16:12   Urine,Clean Catch   Urine Culture - Final


12/15/18 16:32   Blood   Blood Culture - Final











Exam


GENERAL: Alert and oriented 3,,no acute distress


CARDIOVASCULAR:  S1, S2 muffled. No murmur, rub or gallop


RESPIRATION: Respiratory effort normal, Breath sounds diminished in the bases.  


ABDOMEN:  Soft,  nontender . No guarding. no masses palpable. Bowel sounds 

heard.


LEGS: Decreased swelling,edema,erythema, with mild tenderness of right leg from 

groin to foot.warm .positive pulses.  


NERVOUS SYSTEM:  Cranial N 2-12 grossly normal. Moves all 4 limbs.  No focal 

deficits.














The impression and plan of care has been dictated as directed.





.:


I performed a history and examination of this patient,  discussed the same with 

the dictator.  I agree with the dictator's note ,documented as a scribe.  Any 

additional findings or plans will be noted.














Time taken: 35 minutes


Patient Condition at Discharge: Stable





Plan - Discharge Summary


New Discharge Prescriptions: 


New


   Acetaminophen Tab [Tylenol] 650 mg PO Q6HR PRN  tab


     PRN Reason: Mild Pain Or Fever > 100.5


   Clotrimazole Cream [Lotrimin Cream] 1 applic TOPICAL BID #1 tube


   Ibuprofen [Motrin] 400 mg PO Q6HR PRN #20 tab


     PRN Reason: Mild Pain Or Fever > 100.5


   Pantoprazole [Protonix] 40 mg PO AC-BRKFST #15 tablet.


   Cefuroxime Axetil [Cefuroxime] 500 mg PO BID #42 tab


Discharge Medication List





Acetaminophen Tab [Tylenol] 650 mg PO Q6HR PRN  tab 12/20/18 [Rx]


Cefuroxime Axetil [Cefuroxime] 500 mg PO BID #42 tab 12/20/18 [Rx]


Clotrimazole Cream [Lotrimin Cream] 1 applic TOPICAL BID #1 tube 12/20/18 [Rx]


Ibuprofen [Motrin] 400 mg PO Q6HR PRN #20 tab 12/20/18 [Rx]


Pantoprazole [Protonix] 40 mg PO AC-BRKFST #15 tablet. 12/20/18 [Rx]








Follow up Appointment(s)/Referral(s): 


Castro Mendez MD [Primary Care Provider] - 12/24/18 9:50 am


Ambulatory/Diagnostic Orders: 


Complete Blood Count w/diff [LAB.AMB] Time Frame: 3 Days, Location: None 

Selected


Patient Instructions/Handouts:  Cellulitis (DC)


Discharge Disposition: HOME SELF-CARE

## 2018-12-21 ENCOUNTER — HOSPITAL ENCOUNTER (EMERGENCY)
Dept: HOSPITAL 47 - EC | Age: 38
LOS: 1 days | Discharge: HOME | End: 2018-12-22
Payer: COMMERCIAL

## 2018-12-21 DIAGNOSIS — Z85.41: ICD-10-CM

## 2018-12-21 DIAGNOSIS — F17.200: ICD-10-CM

## 2018-12-21 DIAGNOSIS — L03.115: Primary | ICD-10-CM

## 2018-12-21 PROCEDURE — 99283 EMERGENCY DEPT VISIT LOW MDM: CPT

## 2018-12-21 PROCEDURE — 85025 COMPLETE CBC W/AUTO DIFF WBC: CPT

## 2018-12-21 PROCEDURE — 96361 HYDRATE IV INFUSION ADD-ON: CPT

## 2018-12-21 PROCEDURE — 96360 HYDRATION IV INFUSION INIT: CPT

## 2018-12-21 PROCEDURE — 36415 COLL VENOUS BLD VENIPUNCTURE: CPT

## 2018-12-21 PROCEDURE — 87040 BLOOD CULTURE FOR BACTERIA: CPT

## 2018-12-21 PROCEDURE — 80053 COMPREHEN METABOLIC PANEL: CPT

## 2018-12-21 PROCEDURE — 83605 ASSAY OF LACTIC ACID: CPT

## 2018-12-22 VITALS
RESPIRATION RATE: 18 BRPM | HEART RATE: 76 BPM | SYSTOLIC BLOOD PRESSURE: 140 MMHG | DIASTOLIC BLOOD PRESSURE: 75 MMHG | TEMPERATURE: 98.6 F

## 2018-12-22 LAB
ALBUMIN SERPL-MCNC: 3.9 G/DL (ref 3.5–5)
ALP SERPL-CCNC: 157 U/L (ref 38–126)
ALT SERPL-CCNC: 66 U/L (ref 9–52)
ANION GAP SERPL CALC-SCNC: 8 MMOL/L
AST SERPL-CCNC: 60 U/L (ref 14–36)
BUN SERPL-SCNC: 11 MG/DL (ref 7–17)
CALCIUM SPEC-MCNC: 9.6 MG/DL (ref 8.4–10.2)
CELLS COUNTED: 100
CHLORIDE SERPL-SCNC: 107 MMOL/L (ref 98–107)
CO2 SERPL-SCNC: 26 MMOL/L (ref 22–30)
EOSINOPHIL # BLD MANUAL: 0.24 K/UL (ref 0–0.7)
ERYTHROCYTE [DISTWIDTH] IN BLOOD BY AUTOMATED COUNT: 4.08 M/UL (ref 3.8–5.4)
ERYTHROCYTE [DISTWIDTH] IN BLOOD: 13.8 % (ref 11.5–15.5)
GLUCOSE SERPL-MCNC: 100 MG/DL (ref 74–99)
HCT VFR BLD AUTO: 38.9 % (ref 34–46)
HGB BLD-MCNC: 12.8 GM/DL (ref 11.4–16)
LYMPHOCYTES # BLD MANUAL: 1.26 K/UL (ref 1–4.8)
MCH RBC QN AUTO: 31.4 PG (ref 25–35)
MCHC RBC AUTO-ENTMCNC: 32.8 G/DL (ref 31–37)
MCV RBC AUTO: 95.6 FL (ref 80–100)
METAMYELOCYTES # BLD: 0.47 K/UL
MONOCYTES # BLD MANUAL: 0.4 K/UL (ref 0–1)
NEUTROPHILS NFR BLD MANUAL: 65 %
NEUTS SEG # BLD MANUAL: 5.5 K/UL (ref 1.3–7.7)
PLATELET # BLD AUTO: 400 K/UL (ref 150–450)
POTASSIUM SERPL-SCNC: 4.2 MMOL/L (ref 3.5–5.1)
PROT SERPL-MCNC: 6.8 G/DL (ref 6.3–8.2)
SODIUM SERPL-SCNC: 141 MMOL/L (ref 137–145)
WBC # BLD AUTO: 7.9 K/UL (ref 3.8–10.6)

## 2018-12-22 NOTE — ED
General Adult HPI





- General


Chief complaint: Extremity Problem,Nontraumatic


Stated complaint: Recheck, Hx Cellulitis


Time Seen by Provider: 18 01:10


Source: patient, RN notes reviewed


Mode of arrival: ambulatory


Limitations: no limitations





- History of Present Illness


Initial comments: 





38-year-old female With a past medical history of cellulitis, cervical cancer 

presents to the emergency department for a chief complaint of cellulitis.  

Patient states she was just discharged from the hospital yesterday for 

cellulitis.  She states she noticed some erythema on her right lower leg 

earlier today.  Patient states she has not sure if this was there when she was 

discharged.  She states she does not think it is getting worse but cannot be 

sure at this time.  Patient denies fevers or chills.  Patient states she has 

been feeling well since discharge.  She denies any nausea or vomiting.  Patient 

has no other complaints at this time including shortness of breath, chest pain, 

abdominal pain, nausea or vomiting, headache, or visual changes.





- Related Data


 Previous Rx's











 Medication  Instructions  Recorded


 


Acetaminophen Tab [Tylenol] 650 mg PO Q6HR PRN  tab 18


 


Cefuroxime Axetil [Cefuroxime] 500 mg PO BID #42 tab 18


 


Clotrimazole Cream [Lotrimin Cream] 1 applic TOPICAL BID #1 tube 18


 


Ibuprofen [Motrin] 400 mg PO Q6HR PRN #20 tab 18


 


Pantoprazole [Protonix] 40 mg PO AC-BRKFST #15 tablet. 18


 


Azithromycin [Zithromax Z-pack] 250 mg PO AS DIRECTED #6 tab 18











 Allergies











Allergy/AdvReac Type Severity Reaction Status Date / Time


 


No Known Allergies Allergy   Verified 18 23:32














Review of Systems


ROS Statement: 


Those systems with pertinent positive or pertinent negative responses have been 

documented in the HPI.





ROS Other: All systems not noted in ROS Statement are negative.





Past Medical History


Past Medical History: Cancer, Skin Disorder


Additional Past Medical History / Comment(s): migraines, hoarse voice and diff 

swallowing-recent choking, dry skin on foot, hx cellulitis, anemia, hx cervical 

cancer


History of Any Multi-Drug Resistant Organisms: None Reported


Past Surgical History:  Section, Orthopedic Surgery


Additional Past Surgical History / Comment(s): LEEP procedure, left knee 

arthroscopy, oral surgery


Past Anesthesia/Blood Transfusion Reactions: Previous Problems w/ Anesthesia


Additional Past Anesthesia/Blood Transfusion Reaction / Comment(s): spinal 

anesthesia for C/S-"stopped breathing/paralyzed lungs"


Past Psychological History: No Psychological Hx Reported


Smoking Status: Current some day smoker


Past Alcohol Use History: None Reported


Past Drug Use History: None Reported





- Past Family History


  ** Father


Family Medical History: Diabetes Mellitus, Hypertension





  ** Mother


Family Medical History: No Reported History





General Exam


Limitations: no limitations


General appearance: alert, in no apparent distress


Head exam: Present: atraumatic, normocephalic, normal inspection


Eye exam: Present: normal appearance, PERRL, EOMI.  Absent: scleral icterus, 

conjunctival injection, periorbital swelling


ENT exam: Present: normal exam, mucous membranes moist


Neck exam: Present: normal inspection, full ROM.  Absent: tenderness, 

meningismus, lymphadenopathy


Respiratory exam: Present: normal lung sounds bilaterally.  Absent: respiratory 

distress, wheezes, rales, rhonchi, stridor


Cardiovascular Exam: Present: regular rate, normal rhythm, normal heart sounds.

  Absent: systolic murmur, diastolic murmur, rubs, gallop, clicks


Extremities exam: Present: full ROM (Full range of motion of the right ankle), 

normal capillary refill (Capillary refill less than 2 seconds of the right ankle

), other (Patient does have some mild nonpitting edema noted of the right 

distal lower leg.  Minimal 5 cm x 5 cm area of erythema.  Sensation intact in 

the right lower extremity).  Absent: tenderness (No tenderness to the right 

lower leg), calf tenderness (No significant calf tenderness, negative Homans 

sign)


Neurological exam: Present: alert, oriented X3, CN II-XII intact


Psychiatric exam: Present: normal affect, normal mood





Course


 Vital Signs











  18





  23:04 00:06


 


Temperature 98.7 F 


 


Pulse Rate 97 74


 


Respiratory 16 16





Rate  


 


Blood Pressure 149/86 134/70


 


O2 Sat by Pulse 100 98





Oximetry  














Medical Decision Making





- Medical Decision Making





38-year-old female presents to the emergency department for a chief complaint 

of possible right lower extremity cellulitis.  Patient states she was 

discharged for this yesterday and is not sure if that erythema is new.  There 

is a small 5 x 5 cm area of erythema with moderate edema noted of the 

circumferential distal right lower extremity.  Patient states this edema has 

been there since discharge.  Reports from the last admission were reviewed in 

detail. Vitals are within acceptable limits in the emergency department.  CBC 

and CMP are unremarkable.  White count is 7.9.  Lactic 0.7.  Patient does not 

meet sepsis criteria.  Discussed with patient that at this point we could admit 

her to observation or add another antibiotic.  Patient states she would rather 

add another antibiotic and monitor.  I did review culture results with Dr. Stewart and patient will be given azithromycin as this was sensitive.  She will 

continue to take cefuroxime which she is currently taking at home.  Area of 

erythema was marked and patient will monitor to see if this worsens.  She will 

return here if she has any worsening symptoms whatsoever which she agrees with.





- Lab Data


Result diagrams: 


 18 01:59





 18 01:59


 Lab Results











  18 Range/Units





  01:59 01:59 01:59 


 


WBC  7.9    (3.8-10.6)  k/uL


 


RBC  4.08    (3.80-5.40)  m/uL


 


Hgb  12.8    (11.4-16.0)  gm/dL


 


Hct  38.9    (34.0-46.0)  %


 


MCV  95.6    (80.0-100.0)  fL


 


MCH  31.4    (25.0-35.0)  pg


 


MCHC  32.8    (31.0-37.0)  g/dL


 


RDW  13.8    (11.5-15.5)  %


 


Plt Count  400    (150-450)  k/uL


 


Sodium   141   (137-145)  mmol/L


 


Potassium   4.2   (3.5-5.1)  mmol/L


 


Chloride   107   ()  mmol/L


 


Carbon Dioxide   26   (22-30)  mmol/L


 


Anion Gap   8   mmol/L


 


BUN   11   (7-17)  mg/dL


 


Creatinine   0.83   (0.52-1.04)  mg/dL


 


Est GFR (CKD-EPI)AfAm   >90   (>60 ml/min/1.73 sqM)  


 


Est GFR (CKD-EPI)NonAf   90   (>60 ml/min/1.73 sqM)  


 


Glucose   100 H   (74-99)  mg/dL


 


Plasma Lactic Acid Antonio    0.7  (0.7-2.0)  mmol/L


 


Calcium   9.6   (8.4-10.2)  mg/dL


 


Total Bilirubin   0.3   (0.2-1.3)  mg/dL


 


AST   60 H   (14-36)  U/L


 


ALT   66 H   (9-52)  U/L


 


Alkaline Phosphatase   157 H   ()  U/L


 


Total Protein   6.8   (6.3-8.2)  g/dL


 


Albumin   3.9   (3.5-5.0)  g/dL














Disposition


Clinical Impression: 


 Cellulitis





Disposition: HOME SELF-CARE


Condition: Good


Instructions:  Cellulitis (ED)


Additional Instructions: 


Please take antibiotic as directed in addition to antibiotic already given.  

Please follow-up with primary care in 1-2 days.  Monitor for worsening symptoms 

and return immediately if these occur.


Prescriptions: 


Azithromycin [Zithromax Z-pack] 250 mg PO AS DIRECTED #6 tab


Is patient prescribed a controlled substance at d/c from ED?: No


Referrals: 


Castro Mendez MD [Primary Care Provider] - 1-2 days


Time of Disposition: 03:55

## 2018-12-26 ENCOUNTER — HOSPITAL ENCOUNTER (OUTPATIENT)
Dept: HOSPITAL 47 - LABWHC1 | Age: 38
Discharge: HOME | End: 2018-12-26
Attending: NURSE PRACTITIONER
Payer: COMMERCIAL

## 2018-12-26 DIAGNOSIS — L03.90: Primary | ICD-10-CM

## 2018-12-26 DIAGNOSIS — R52: ICD-10-CM

## 2018-12-26 DIAGNOSIS — R78.81: ICD-10-CM

## 2018-12-26 LAB
ANION GAP SERPL CALC-SCNC: 7.2 MMOL/L (ref 4–12)
BASOPHILS # BLD AUTO: 0 K/UL (ref 0–0.2)
BASOPHILS NFR BLD AUTO: 0 %
BUN SERPL-SCNC: 12 MG/DL (ref 9–27)
CALCIUM SPEC-MCNC: 9.3 MG/DL (ref 8.7–10.3)
CHLORIDE SERPL-SCNC: 107 MMOL/L (ref 96–109)
CO2 SERPL-SCNC: 26.8 MMOL/L (ref 21.6–31.8)
EOSINOPHIL # BLD AUTO: 0.1 K/UL (ref 0–0.7)
EOSINOPHIL NFR BLD AUTO: 2 %
ERYTHROCYTE [DISTWIDTH] IN BLOOD BY AUTOMATED COUNT: 4.07 M/UL (ref 3.8–5.4)
ERYTHROCYTE [DISTWIDTH] IN BLOOD: 14 % (ref 11.5–15.5)
GLUCOSE SERPL-MCNC: 88 MG/DL (ref 70–110)
HCT VFR BLD AUTO: 39.6 % (ref 34–46)
HGB BLD-MCNC: 12.9 GM/DL (ref 11.4–16)
LYMPHOCYTES # SPEC AUTO: 1.1 K/UL (ref 1–4.8)
LYMPHOCYTES NFR SPEC AUTO: 13 %
MCH RBC QN AUTO: 31.6 PG (ref 25–35)
MCHC RBC AUTO-ENTMCNC: 32.5 G/DL (ref 31–37)
MCV RBC AUTO: 97.3 FL (ref 80–100)
MONOCYTES # BLD AUTO: 0.5 K/UL (ref 0–1)
MONOCYTES NFR BLD AUTO: 5 %
NEUTROPHILS # BLD AUTO: 6.7 K/UL (ref 1.3–7.7)
NEUTROPHILS NFR BLD AUTO: 78 %
PLATELET # BLD AUTO: 576 K/UL (ref 150–450)
POTASSIUM SERPL-SCNC: 5 MMOL/L (ref 3.5–5.5)
SODIUM SERPL-SCNC: 141 MMOL/L (ref 135–145)
WBC # BLD AUTO: 8.5 K/UL (ref 3.8–10.6)

## 2018-12-26 PROCEDURE — 80074 ACUTE HEPATITIS PANEL: CPT

## 2018-12-26 PROCEDURE — 85025 COMPLETE CBC W/AUTO DIFF WBC: CPT

## 2018-12-26 PROCEDURE — 36415 COLL VENOUS BLD VENIPUNCTURE: CPT

## 2018-12-26 PROCEDURE — 80048 BASIC METABOLIC PNL TOTAL CA: CPT

## 2019-01-25 NOTE — US
EXAMINATION TYPE: US venous doppler duplex LE RT

 

DATE OF EXAM: 12/16/2018 2:41 PM

 

COMPARISON: US June 30, 2018

 

CLINICAL HISTORY: DVT R/O. Right leg pain and swelling x 2 days, patient on blood thinners

 

SIDE PERFORMED: Right  

 

TECHNIQUE:  The lower extremity deep venous system is examined utilizing real time linear array sonog
connie with graded compression, doppler sonography and color-flow sonography.

 

VESSELS IMAGED:

External Iliac Vein (EIV)

Common Femoral Vein

Deep Femoral Vein

Greater Saphenous Vein *

Femoral Vein

Popliteal Vein

Small Saphenous Vein *

Proximal Calf Veins

(* superficial vessels)

 

**Compression images not done from EIV through distal femoral vein, patient unable to tolerate transd
ucer pressure

 

Right Leg:  Visualized portions appear negative for DVT

 

*Right groin: multiple lymph nodes seen with largest measuring 4.1 x 1.3 x 3.9cm. Grayscale, color do
ppler, spectral doppler imaging performed of the deep veins of the right lower extremity.  There is n
ormal flow and vascular waveforms.

 

IMPRESSION:  Suboptimal study without convincing evidence of acute DVT. Abnormal right groin lymph no
bill are noted which are greater than 1 cm on long axis with eccentric cortical thickening, correlate 
for infection or inflammation. Follow-up ultrasound is advised after medical treatment to document re
solution of abnormal adenopathy. Atrial fibrillation

## 2020-07-06 ENCOUNTER — HOSPITAL ENCOUNTER (OUTPATIENT)
Dept: HOSPITAL 47 - RADUSWWP | Age: 40
Discharge: HOME | End: 2020-07-06
Attending: FAMILY MEDICINE
Payer: COMMERCIAL

## 2020-07-06 DIAGNOSIS — I87.2: Primary | ICD-10-CM

## 2020-07-06 PROCEDURE — 93970 EXTREMITY STUDY: CPT

## 2020-07-06 PROCEDURE — 93922 UPR/L XTREMITY ART 2 LEVELS: CPT

## 2020-07-06 NOTE — US
LOWER EXTREMITY VENOUS INSUFFICIENCY

 

CLINICAL HISTORY: M79.89 SWELLING OF JUAN C LIMBS.

 

SIDE PERFORMED:  Bilateral  

 

1)  Color flow is present and patency is documented in the following vessels.  No DVT or SVT is noted
.       

   EIV           

   Common Femoral Vein    

   Deep Femoral Vein

   Femoral Vein

   Popliteal Vein

   Proximal Calf Veins

 

   Greater Saph Vein  

   Upper Small Saph Vein  

 

2)  There is venous reflux noted at the following venous levels:

Right- EIV, GSV

Left- EIV, GSV, CFV

 

 

 

 

 

 

IMPRESSION:

Venous reflux as noted.

## 2020-07-08 NOTE — P.ARTDOP
Arterial Doppler





LOWER EXTREMITY ARTERIAL DOPPLER:





DATE OF SERVICE: 07/06/2020





Reason for study: Leg swelling.





Doppler waveforms: Multiphasic bilaterally throughout.





Pulse volume recording: [].





Pressure gradients: None.





Ankle-brachial indices: Greater than 1 bilaterally.





Toe brachial indices: Normal on the right, normal on the left





Impression: Normal study.

## 2022-10-23 ENCOUNTER — HOSPITAL ENCOUNTER (EMERGENCY)
Dept: HOSPITAL 47 - EC | Age: 42
Discharge: HOME | End: 2022-10-23
Payer: COMMERCIAL

## 2022-10-23 VITALS
HEART RATE: 92 BPM | DIASTOLIC BLOOD PRESSURE: 60 MMHG | SYSTOLIC BLOOD PRESSURE: 121 MMHG | RESPIRATION RATE: 20 BRPM | TEMPERATURE: 98.2 F

## 2022-10-23 DIAGNOSIS — R10.30: Primary | ICD-10-CM

## 2022-10-23 DIAGNOSIS — Z85.41: ICD-10-CM

## 2022-10-23 LAB
ALBUMIN SERPL-MCNC: 4.1 G/DL (ref 3.5–5)
ALP SERPL-CCNC: 60 U/L (ref 38–126)
ALT SERPL-CCNC: 12 U/L (ref 4–34)
ANION GAP SERPL CALC-SCNC: 9 MMOL/L
AST SERPL-CCNC: 17 U/L (ref 14–36)
BASOPHILS # BLD AUTO: 0 K/UL (ref 0–0.2)
BASOPHILS NFR BLD AUTO: 0 %
BUN SERPL-SCNC: 13 MG/DL (ref 7–17)
CALCIUM SPEC-MCNC: 8.7 MG/DL (ref 8.4–10.2)
CHLORIDE SERPL-SCNC: 105 MMOL/L (ref 98–107)
CO2 SERPL-SCNC: 24 MMOL/L (ref 22–30)
EOSINOPHIL # BLD AUTO: 0.1 K/UL (ref 0–0.7)
EOSINOPHIL NFR BLD AUTO: 1 %
ERYTHROCYTE [DISTWIDTH] IN BLOOD BY AUTOMATED COUNT: 4.13 M/UL (ref 3.8–5.4)
ERYTHROCYTE [DISTWIDTH] IN BLOOD: 12.6 % (ref 11.5–15.5)
GLUCOSE SERPL-MCNC: 93 MG/DL (ref 74–99)
HCT VFR BLD AUTO: 39.2 % (ref 34–46)
HGB BLD-MCNC: 13.6 GM/DL (ref 11.4–16)
LIPASE SERPL-CCNC: 101 U/L (ref 23–300)
LYMPHOCYTES # SPEC AUTO: 0.6 K/UL (ref 1–4.8)
LYMPHOCYTES NFR SPEC AUTO: 12 %
MCH RBC QN AUTO: 32.8 PG (ref 25–35)
MCHC RBC AUTO-ENTMCNC: 34.6 G/DL (ref 31–37)
MCV RBC AUTO: 94.9 FL (ref 80–100)
MONOCYTES # BLD AUTO: 0.3 K/UL (ref 0–1)
MONOCYTES NFR BLD AUTO: 5 %
NEUTROPHILS # BLD AUTO: 3.8 K/UL (ref 1.3–7.7)
NEUTROPHILS NFR BLD AUTO: 80 %
PH UR: 6 [PH] (ref 5–8)
PLATELET # BLD AUTO: 312 K/UL (ref 150–450)
POTASSIUM SERPL-SCNC: 4.2 MMOL/L (ref 3.5–5.1)
PROT SERPL-MCNC: 6.3 G/DL (ref 6.3–8.2)
RBC UR QL: 12 /HPF (ref 0–5)
SODIUM SERPL-SCNC: 138 MMOL/L (ref 137–145)
SP GR UR: 1.02 (ref 1–1.03)
SQUAMOUS UR QL AUTO: 1 /HPF (ref 0–4)
UROBILINOGEN UR QL STRIP: <2 MG/DL (ref ?–2)
WBC # BLD AUTO: 4.7 K/UL (ref 3.8–10.6)
WBC # UR AUTO: <1 /HPF (ref 0–5)

## 2022-10-23 PROCEDURE — 96361 HYDRATE IV INFUSION ADD-ON: CPT

## 2022-10-23 PROCEDURE — 83605 ASSAY OF LACTIC ACID: CPT

## 2022-10-23 PROCEDURE — 81025 URINE PREGNANCY TEST: CPT

## 2022-10-23 PROCEDURE — 99284 EMERGENCY DEPT VISIT MOD MDM: CPT

## 2022-10-23 PROCEDURE — 80053 COMPREHEN METABOLIC PANEL: CPT

## 2022-10-23 PROCEDURE — 96374 THER/PROPH/DIAG INJ IV PUSH: CPT

## 2022-10-23 PROCEDURE — 74177 CT ABD & PELVIS W/CONTRAST: CPT

## 2022-10-23 PROCEDURE — 81001 URINALYSIS AUTO W/SCOPE: CPT

## 2022-10-23 PROCEDURE — 85025 COMPLETE CBC W/AUTO DIFF WBC: CPT

## 2022-10-23 PROCEDURE — 83690 ASSAY OF LIPASE: CPT

## 2022-10-23 PROCEDURE — 36415 COLL VENOUS BLD VENIPUNCTURE: CPT

## 2022-10-23 NOTE — ED
Abdominal Pain HPI





- General


Chief Complaint: Abdominal Pain


Stated Complaint: Hernia


Time Seen by Provider: 10/23/22 10:10


Source: patient, RN notes reviewed


Mode of arrival: ambulatory


Limitations: no limitations





- History of Present Illness


Initial Comments: 


This a 42-year-old female presents to emergency department with chief complaint 

of abdominal pain.  Patient states she is scheduled for surgery with Dr. steinberg  

Patient states that she feels a inguinal hernia is getting worse.  Patient 

states she has lower abdominal pain, pressure, bloating.  Patient denies change 

in bowel habits no dysuria no hematuria denies chest pregnancy.








- Related Data


                                  Previous Rx's











 Medication  Instructions  Recorded


 


Acetaminophen Tab [Tylenol] 650 mg PO Q6HR PRN  tab 18


 


Clotrimazole Cream [Lotrimin Cream] 1 applic TOPICAL BID #1 tube 18


 


Ibuprofen [Motrin] 400 mg PO Q6HR PRN #20 tab 18


 


Pantoprazole [Protonix] 40 mg PO AC-BRKFST #15 tablet. 18


 


cefUROXime axetiL [Cefuroxime] 500 mg PO BID #42 tab 18


 


Azithromycin [Zithromax Z-pack (6 250 mg PO AS DIRECTED #6 tab 18





tabs)]  


 


Ibuprofen [Motrin] 600 mg PO Q8HR PRN #20 tab 10/23/22











                                    Allergies











Allergy/AdvReac Type Severity Reaction Status Date / Time


 


No Known Allergies Allergy   Verified 10/23/22 10:07














Review of Systems


ROS Statement: 


Those systems with pertinent positive or pertinent negative responses have been 

documented in the HPI.





ROS Other: All systems not noted in ROS Statement are negative.





Past Medical History


Past Medical History: Cancer, Skin Disorder


Additional Past Medical History / Comment(s): migraines, hoarse voice and diff 

swallowing-recent choking, dry skin on foot, hx cellulitis, anemia, hx cervical 

cancer


History of Any Multi-Drug Resistant Organisms: None Reported


Past Surgical History:  Section, Orthopedic Surgery


Additional Past Surgical History / Comment(s): LEEP procedure, left knee 

arthroscopy, oral surgery


Past Anesthesia/Blood Transfusion Reactions: Previous Problems w/ Anesthesia


Additional Past Anesthesia/Blood Transfusion Reaction / Comment(s): spinal 

anesthesia for C/S-"stopped breathing/paralyzed lungs"


Past Psychological History: No Psychological Hx Reported


Smoking Status: Never smoker


Past Alcohol Use History: None Reported


Past Drug Use History: None Reported





- Past Family History


  ** Father


Family Medical History: Diabetes Mellitus, Hypertension





  ** Mother


Family Medical History: No Reported History





General Exam


Limitations: no limitations


General appearance: alert, in no apparent distress


Head exam: Present: atraumatic, normocephalic, normal inspection


Neck exam: Present: normal inspection.  Absent: tenderness, meningismus, 

lymphadenopathy


Respiratory exam: Present: normal lung sounds bilaterally.  Absent: respiratory 

distress, wheezes, rales, rhonchi, stridor


Cardiovascular Exam: Present: regular rate, normal rhythm, normal heart sounds. 

 Absent: systolic murmur, diastolic murmur, rubs, gallop, clicks


GI/Abdominal exam: Present: soft, tenderness, normal bowel sounds.  Absent: 

distended, guarding, rebound, rigid


Back exam: Absent: CVA tenderness (R), CVA tenderness (L)


Neurological exam: Present: alert


Skin exam: Present: warm, dry, intact, normal color.  Absent: rash





Course





                                   Vital Signs











  10/23/22





  10:05


 


Temperature 98.2 F


 


Pulse Rate 92


 


Respiratory 20





Rate 


 


Blood Pressure 121/60


 


O2 Sat by Pulse 99





Oximetry 














Medical Decision Making





- Medical Decision Making





CT shows evidence of old uterus, fluid in the pelvis, ovarian cysts this may 

related to a ruptured ovarian cyst given the patient's symptoms.  Patient does 

not have any other acute findings.  Patient was discharged in stable condition 

return parameters were discussed.





- Lab Data


Result diagrams: 


                                 10/23/22 10:40





                                 10/23/22 10:40





                                   Lab Results











  10/23/22 10/23/22 10/23/22 Range/Units





  10:40 10:40 10:40 


 


WBC  4.7    (3.8-10.6)  k/uL


 


RBC  4.13    (3.80-5.40)  m/uL


 


Hgb  13.6    (11.4-16.0)  gm/dL


 


Hct  39.2    (34.0-46.0)  %


 


MCV  94.9    (80.0-100.0)  fL


 


MCH  32.8    (25.0-35.0)  pg


 


MCHC  34.6    (31.0-37.0)  g/dL


 


RDW  12.6    (11.5-15.5)  %


 


Plt Count  312    (150-450)  k/uL


 


MPV  7.5    


 


Neutrophils %  80    %


 


Lymphocytes %  12    %


 


Monocytes %  5    %


 


Eosinophils %  1    %


 


Basophils %  0    %


 


Neutrophils #  3.8    (1.3-7.7)  k/uL


 


Lymphocytes #  0.6 L    (1.0-4.8)  k/uL


 


Monocytes #  0.3    (0-1.0)  k/uL


 


Eosinophils #  0.1    (0-0.7)  k/uL


 


Basophils #  0.0    (0-0.2)  k/uL


 


Sodium   138   (137-145)  mmol/L


 


Potassium   4.2   (3.5-5.1)  mmol/L


 


Chloride   105   ()  mmol/L


 


Carbon Dioxide   24   (22-30)  mmol/L


 


Anion Gap   9   mmol/L


 


BUN   13   (7-17)  mg/dL


 


Creatinine   0.77   (0.52-1.04)  mg/dL


 


Est GFR (CKD-EPI)AfAm   >90   (>60 ml/min/1.73 sqM)  


 


Est GFR (CKD-EPI)NonAf   >90   (>60 ml/min/1.73 sqM)  


 


Glucose   93   (74-99)  mg/dL


 


Plasma Lactic Acid Antonio    0.6 L  (0.7-2.0)  mmol/L


 


Calcium   8.7   (8.4-10.2)  mg/dL


 


Total Bilirubin   0.6   (0.2-1.3)  mg/dL


 


AST   17   (14-36)  U/L


 


ALT   12   (4-34)  U/L


 


Alkaline Phosphatase   60   ()  U/L


 


Total Protein   6.3   (6.3-8.2)  g/dL


 


Albumin   4.1   (3.5-5.0)  g/dL


 


Lipase   101   ()  U/L


 


Urine Color     


 


Urine Appearance     (Clear)  


 


Urine pH     (5.0-8.0)  


 


Ur Specific Gravity     (1.001-1.035)  


 


Urine Protein     (Negative)  


 


Urine Glucose (UA)     (Negative)  


 


Urine Ketones     (Negative)  


 


Urine Blood     (Negative)  


 


Urine Nitrite     (Negative)  


 


Urine Bilirubin     (Negative)  


 


Urine Urobilinogen     (<2.0)  mg/dL


 


Ur Leukocyte Esterase     (Negative)  


 


Urine RBC     (0-5)  /hpf


 


Urine WBC     (0-5)  /hpf


 


Ur Squamous Epith Cells     (0-4)  /hpf


 


Urine Bacteria     (None)  /hpf


 


Urine HCG, Qual     (Not Detectd)  














  10/23/22 10/23/22 Range/Units





  10:40 10:40 


 


WBC    (3.8-10.6)  k/uL


 


RBC    (3.80-5.40)  m/uL


 


Hgb    (11.4-16.0)  gm/dL


 


Hct    (34.0-46.0)  %


 


MCV    (80.0-100.0)  fL


 


MCH    (25.0-35.0)  pg


 


MCHC    (31.0-37.0)  g/dL


 


RDW    (11.5-15.5)  %


 


Plt Count    (150-450)  k/uL


 


MPV    


 


Neutrophils %    %


 


Lymphocytes %    %


 


Monocytes %    %


 


Eosinophils %    %


 


Basophils %    %


 


Neutrophils #    (1.3-7.7)  k/uL


 


Lymphocytes #    (1.0-4.8)  k/uL


 


Monocytes #    (0-1.0)  k/uL


 


Eosinophils #    (0-0.7)  k/uL


 


Basophils #    (0-0.2)  k/uL


 


Sodium    (137-145)  mmol/L


 


Potassium    (3.5-5.1)  mmol/L


 


Chloride    ()  mmol/L


 


Carbon Dioxide    (22-30)  mmol/L


 


Anion Gap    mmol/L


 


BUN    (7-17)  mg/dL


 


Creatinine    (0.52-1.04)  mg/dL


 


Est GFR (CKD-EPI)AfAm    (>60 ml/min/1.73 sqM)  


 


Est GFR (CKD-EPI)NonAf    (>60 ml/min/1.73 sqM)  


 


Glucose    (74-99)  mg/dL


 


Plasma Lactic Acid Antonio    (0.7-2.0)  mmol/L


 


Calcium    (8.4-10.2)  mg/dL


 


Total Bilirubin    (0.2-1.3)  mg/dL


 


AST    (14-36)  U/L


 


ALT    (4-34)  U/L


 


Alkaline Phosphatase    ()  U/L


 


Total Protein    (6.3-8.2)  g/dL


 


Albumin    (3.5-5.0)  g/dL


 


Lipase    ()  U/L


 


Urine Color   Yellow  


 


Urine Appearance   Clear  (Clear)  


 


Urine pH   6.0  (5.0-8.0)  


 


Ur Specific Gravity   1.019  (1.001-1.035)  


 


Urine Protein   Negative  (Negative)  


 


Urine Glucose (UA)   Negative  (Negative)  


 


Urine Ketones   Negative  (Negative)  


 


Urine Blood   Moderate H  (Negative)  


 


Urine Nitrite   Negative  (Negative)  


 


Urine Bilirubin   Negative  (Negative)  


 


Urine Urobilinogen   <2.0  (<2.0)  mg/dL


 


Ur Leukocyte Esterase   Negative  (Negative)  


 


Urine RBC   12 H  (0-5)  /hpf


 


Urine WBC   <1  (0-5)  /hpf


 


Ur Squamous Epith Cells   1  (0-4)  /hpf


 


Urine Bacteria   Rare H  (None)  /hpf


 


Urine HCG, Qual  Not Detected   (Not Detectd)  














Disposition


Clinical Impression: 


 Abdominal pain





Disposition: HOME SELF-CARE


Condition: Stable


Instructions (If sedation given, give patient instructions):  Abdominal Pain 

(ED)


Additional Instructions: 


Please return to the Emergency Department if symptoms worsen or any other 

concerns.


Prescriptions: 


Ibuprofen [Motrin] 600 mg PO Q8HR PRN #20 tab


 PRN Reason: Pain


Is patient prescribed a controlled substance at d/c from ED?: No


Referrals: 


Timothy Rae DO [Primary Care Provider] - 1-2 days


Time of Disposition: 12:50 

## 2022-10-23 NOTE — CT
EXAMINATION TYPE: CT abdomen pelvis w con

 

DATE OF EXAM: 10/23/2022

 

HISTORY: Lower abdominal pain with known inguinal hernia per patient.

 

CT DLP: 635.6mGycm  Automated Exposure Control for Dose Reduction was Utilized.

 

CONTRAST: 

CT scan of the abdomen and pelvis is performed without oral but with IV Contrast, patient injected wi
th 100 mL of Isovue 300.

 

COMPARISON: None

 

FINDINGS:

 

LUNG BASES: Mild bibasilar linear scarring and/or atelectasis.

 

LIVER/GB: No significant abnormality is appreciated.

 

PANCREAS: No significant abnormality is seen.

 

SPLEEN: No significant abnormality is seen.

 

ADRENALS: No significant abnormality is seen.

 

KIDNEYS: Symmetric cortical medullary uptake and excretion without hydronephrosis seen bilaterally. C
ircumaortic left renal vein which is normal variant.

 

BOWEL: Suboptimal evaluation without enteric contrast. No suspicious small or large bowel dilatation 
is seen. Appendix not dilated from base of cecum in the right upper pelvis.

 

UTERUS/ADNEXA: Anteverted slightly bulky uterus raises concern for underlying fibroids. Left ovary ha
s rim hyperintense 1.7 cm hypodense lesion consistent with corpus luteal cyst axial image 67. Right o
vary symmetric and normal in size near axial image 59. Small-to-moderate amount of free fluid in the 
pelvis axial image 64.

 

LYMPH NODES: No greater than 1cm abdominal or pelvic lymph nodes are appreciated.

 

OSSEOUS STRUCTURES: Facet arthropathy lower lumbar levels.

 

OTHER: Slightly prominent bilateral groin lymph nodes right more prominent than left without definiti
ve abnormal greater than 1.0 cm adenopathy. No significant groin hernia.

 

IMPRESSION: Heterogeneous bulky uterus is nonspecific, correlate clinically. Possible diffuse fibroid
 disease. Small-to-moderate amount of free fluid in the posterior pelvis is nonspecific finding. No g
roin hernia or abnormal adenopathy. No acute findings clearly evident.

## 2022-11-09 ENCOUNTER — HOSPITAL ENCOUNTER (OUTPATIENT)
Dept: HOSPITAL 47 - LABPAT | Age: 42
Discharge: HOME | End: 2022-11-09
Attending: SURGERY
Payer: COMMERCIAL

## 2022-11-09 DIAGNOSIS — K40.90: ICD-10-CM

## 2022-11-09 DIAGNOSIS — Z01.818: Primary | ICD-10-CM

## 2022-11-09 LAB
BASOPHILS # BLD AUTO: 0.01 X 10*3/UL (ref 0–0.1)
BASOPHILS NFR BLD AUTO: 0.2 %
EOSINOPHIL # BLD AUTO: 0.09 X 10*3/UL (ref 0.04–0.35)
EOSINOPHIL NFR BLD AUTO: 1.5 %
ERYTHROCYTE [DISTWIDTH] IN BLOOD BY AUTOMATED COUNT: 3.77 X 10*6/UL (ref 4.1–5.2)
ERYTHROCYTE [DISTWIDTH] IN BLOOD: 12.5 % (ref 11.5–14.5)
HCT VFR BLD AUTO: 36.6 % (ref 37.2–46.3)
HGB BLD-MCNC: 12.2 G/DL (ref 12–15)
IMM GRANULOCYTES BLD QL AUTO: 0.7 %
LYMPHOCYTES # SPEC AUTO: 0.55 X 10*3/UL (ref 0.9–5)
LYMPHOCYTES NFR SPEC AUTO: 9.4 %
MCH RBC QN AUTO: 32.4 PG (ref 27–32)
MCHC RBC AUTO-ENTMCNC: 33.3 G/DL (ref 32–37)
MCV RBC AUTO: 97.1 FL (ref 80–97)
MONOCYTES # BLD AUTO: 0.44 X 10*3/UL (ref 0.2–1)
MONOCYTES NFR BLD AUTO: 7.5 %
NEUTROPHILS # BLD AUTO: 4.7 X 10*3/UL (ref 1.8–7.7)
NEUTROPHILS NFR BLD AUTO: 80.7 %
NRBC BLD AUTO-RTO: 0 /100 WBCS (ref 0–0)
PLATELET # BLD AUTO: 320 X 10*3/UL (ref 140–440)
WBC # BLD AUTO: 5.83 X 10*3/UL (ref 4.5–10)

## 2022-11-09 PROCEDURE — 93005 ELECTROCARDIOGRAM TRACING: CPT

## 2022-11-09 PROCEDURE — 85025 COMPLETE CBC W/AUTO DIFF WBC: CPT

## 2022-11-18 ENCOUNTER — HOSPITAL ENCOUNTER (OUTPATIENT)
Dept: HOSPITAL 47 - OR | Age: 42
Discharge: HOME | End: 2022-11-18
Attending: SURGERY
Payer: COMMERCIAL

## 2022-11-18 VITALS — BODY MASS INDEX: 29.2 KG/M2

## 2022-11-18 VITALS — TEMPERATURE: 97.1 F

## 2022-11-18 VITALS — RESPIRATION RATE: 16 BRPM

## 2022-11-18 VITALS — DIASTOLIC BLOOD PRESSURE: 66 MMHG | HEART RATE: 82 BPM | SYSTOLIC BLOOD PRESSURE: 112 MMHG

## 2022-11-18 DIAGNOSIS — Z82.49: ICD-10-CM

## 2022-11-18 DIAGNOSIS — Z87.891: ICD-10-CM

## 2022-11-18 DIAGNOSIS — L98.9: ICD-10-CM

## 2022-11-18 DIAGNOSIS — G43.909: ICD-10-CM

## 2022-11-18 DIAGNOSIS — Z83.3: ICD-10-CM

## 2022-11-18 DIAGNOSIS — Z98.890: ICD-10-CM

## 2022-11-18 DIAGNOSIS — Z98.51: ICD-10-CM

## 2022-11-18 DIAGNOSIS — Z79.899: ICD-10-CM

## 2022-11-18 DIAGNOSIS — Z98.891: ICD-10-CM

## 2022-11-18 DIAGNOSIS — Z85.41: ICD-10-CM

## 2022-11-18 DIAGNOSIS — K40.90: Primary | ICD-10-CM

## 2022-11-18 LAB — GLUCOSE BLD-MCNC: 82 MG/DL (ref 70–110)

## 2022-11-18 PROCEDURE — 86850 RBC ANTIBODY SCREEN: CPT

## 2022-11-18 PROCEDURE — 86901 BLOOD TYPING SEROLOGIC RH(D): CPT

## 2022-11-18 PROCEDURE — 81025 URINE PREGNANCY TEST: CPT

## 2022-11-18 PROCEDURE — 49650 LAP ING HERNIA REPAIR INIT: CPT

## 2022-11-18 PROCEDURE — 86900 BLOOD TYPING SEROLOGIC ABO: CPT

## 2022-11-18 RX ADMIN — HYDROMORPHONE HYDROCHLORIDE PRN MG: 1 INJECTION, SOLUTION INTRAMUSCULAR; INTRAVENOUS; SUBCUTANEOUS at 15:40

## 2022-11-18 RX ADMIN — HYDROMORPHONE HYDROCHLORIDE PRN MG: 1 INJECTION, SOLUTION INTRAMUSCULAR; INTRAVENOUS; SUBCUTANEOUS at 15:55

## 2022-11-18 NOTE — P.GSHP
History of Present Illness


H&P Date: 22


Chief Complaint: Left inguinal hernia





42-year-old female seen in October complaining of pain and bulging left groin.  

No history of prior hernias.  Hernia was palpated on exam.  Apparently a few 

weeks later the patient went to the ER with pain.  A CAT scan did not show any o

ther abnormalities.  It in fact did not show a definite hernia at that time.





Past Medical History


Past Medical History: Cancer, Skin Disorder


Additional Past Medical History / Comment(s): migraines, hx cellulitis, hx 

anemia, hx cervical cancer


History of Any Multi-Drug Resistant Organisms: None Reported


Past Surgical History:  Section, Orthopedic Surgery, Tubal Ligation


Additional Past Surgical History / Comment(s): LEEP procedure, left knee arthro

scopy, oral surgery


Past Anesthesia/Blood Transfusion Reactions: Previous Problems w/ Anesthesia


Additional Past Anesthesia/Blood Transfusion Reaction / Comment(s): spinal 

anesthesia for C/S-"stopped breathing/paralyzed lungs" 


Smoking Status: Former smoker





- Past Family History


  ** Father


Family Medical History: Diabetes Mellitus, Hypertension





  ** Mother


Family Medical History: No Reported History





Medications and Allergies


                                Home Medications











 Medication  Instructions  Recorded  Confirmed  Type


 


Acetaminophen Tab [Tylenol] 650 mg PO Q6HR PRN  tab 18 Rx


 


Ibuprofen [Motrin] 600 mg PO Q8HR PRN #20 tab 10/23/22 11/16/22 Rx








                                    Allergies











Allergy/AdvReac Type Severity Reaction Status Date / Time


 


No Known Allergies Allergy   Verified 22 12:39














Surgical - Exam








Physical exam:


General: Well-developed, well-nourished


HEENT: Normocephalic, sclerae nonicteric


Abdomen: Nontender, nondistended, reducible left inguinal hernia


Extremities: No edema


Neuro: Alert and oriented





Assessment and Plan


(1) Left inguinal hernia


Narrative/Plan: 


42-year-old female with reducible left inguinal hernia.  We'll proceed with 

laparoscopic da Katherine assisted repair left inguinal hernia with mesh, possible 

open, possible bilateral.  Risks of bleeding, infection, recurrence, bladder and

 bowel injury, numbness, nerve injury, conversion to an open procedure were 

discussed with the patient.  The patient understands and wishes to proceed.


Status: Acute   Code(s): K40.90 - UNIL INGUINAL HERNIA, W/O OBST OR GANGR, NOT 

SPCF AS RECUR   SNOMED Code(s): 075747340

## 2022-11-18 NOTE — P.OP
Date of Procedure: 22


Procedure(s) Performed: 


PREOPERATIVE DIAGNOSIS: Left inguinal hernia


POSTOPERATIVE DIAGNOSIS: Left indirect inguinal hernia


PROCEDURE: Laparoscopic da Katherine assisted repair left inguinal hernia with mesh


SURGEON: Dr. Valenzuela


ANESTHESIA: General


OPERATIVE PROCEDURE DETAILS:  Patient was placed in the operating table in the 

supine position.  The patient was placed under general anesthesia.  The abdomen 

was prepped and draped in usual sterile fashion.  A small curvilinear 

supraumbilical incision was made.  The fascia was retracted anteriorly with 

Carla forceps.  The Veress needle was inserted.  The saline drop test was 

normal.  Insufflation took place to 15 mmHg. An 8 mm trocar was placed into the 

peritoneal cavity.  2 additional 8 mm trochars were placed in the right upper 

quadrant and left upper quadrant under visualization.  The robotic arms were 

then brought in and docked into place.  The fenestrated bipolar was used in the 

left arm and the laparoscopic neal was utilized in the right arm.  A 30 8 mm

scope was used in the up position.  The peritoneal cavity was inspected.  The 

patient had some adhesions between the omentum and the previous  scar. 

The patient's hernia was an indirect hernia on the left-hand side.  There was no

hernia seen on the right.  The peritoneum was incised in a horizontal fashion 

cephalad to the internal inguinal ring.  Following that careful dissection of 

the preperitoneal space took place.  This took place using both electrocautery, 

sharp dissection but primarily blunt dissection.  Visualization of the pubic 

tubercle and Rogerio's ligament took place medially.  Full dissection took place 

laterally as well.  The hernia sac was fully dissected.  Once we had adequate 

space the large Pro  mesh was advanced into the preperitoneal space and 

flattened out appropriately to cover all potential hernia sites.  No sutures 

were used.  The peritoneal defect was then closed using a absorbable 2-0 VLok 

suture.  The hernia sac was incorporated into the peritoneal closure to help 

prevent future recurrence.  The pneumoperitoneum was then evacuated.  The skin 

of all 3 sites was closed using a 4-0 Monocryl stitch.  Skin glue was then 

applied.


HERNIA CHARACTERISTICS:


TYPE OF MESH USED: Large Pro 


LOCATION OF MESH: Preperitoneal


FIXATION: None


PREOPERATIVE DISCUSSION ON SMOKING CESSASTION: Yes


PREOPERATIVE DISCUSSION ON MORBID OBESITY: Yes


PREOPERATIVE DISCUSSION ON APPROPRIATE USE OF NARCOTIC USE: Yes


PREOPERATIVE EDUCATION: Multi Modal, Smoking Cessation and Weight Loss with BMI 

over 35. 


DISPOSITION:  Stable to recovery room

## 2024-10-20 ENCOUNTER — HOSPITAL ENCOUNTER (EMERGENCY)
Dept: HOSPITAL 47 - EC | Age: 44
Discharge: HOME | End: 2024-10-20
Payer: COMMERCIAL

## 2024-10-20 VITALS
RESPIRATION RATE: 18 BRPM | HEART RATE: 80 BPM | DIASTOLIC BLOOD PRESSURE: 83 MMHG | TEMPERATURE: 98.9 F | SYSTOLIC BLOOD PRESSURE: 146 MMHG

## 2024-10-20 PROCEDURE — 96372 THER/PROPH/DIAG INJ SC/IM: CPT

## 2024-10-20 PROCEDURE — 99283 EMERGENCY DEPT VISIT LOW MDM: CPT

## 2024-10-20 PROCEDURE — 70140 X-RAY EXAM OF FACIAL BONES: CPT

## 2024-10-20 RX ADMIN — ACETAMINOPHEN AND CODEINE PHOSPHATE STA EACH: 300; 30 TABLET ORAL at 19:15

## 2024-10-20 RX ADMIN — AMOXICILLIN AND CLAVULANATE POTASSIUM STA EACH: 875; 125 TABLET, FILM COATED ORAL at 19:16

## 2024-10-20 RX ADMIN — KETOROLAC TROMETHAMINE STA MG: 15 INJECTION, SOLUTION INTRAMUSCULAR; INTRAVENOUS at 18:58

## 2024-10-20 NOTE — XR
EXAMINATION TYPE: XR facial bones limited

 

DATE OF EXAM: 10/20/2024 6:35 PM

 

CLINICAL INDICATION: Female, 44 years old with history of Left facial pain, cracked left tooth; PHH

 

COMPARISON: None

 

TECHNIQUE: Multiple  views of the facial bones. Frontal, lateral and tilted frontal views.

 

FINDINGS:

The soft tissues are within normal limits.  Fracture of the left canine tooth as seen on lateral view
. No other fractures identified. The paranasal sinuses are well aerated. Dental fillings noted throug
hout the teeth.

 

IMPRESSION: 

Fracture of the tooth as given in the provided history.

 

[facility], Workstation: [id number], 10/20/2024 6:52 PM

## 2024-10-20 NOTE — ED
ENT HPI





- General


Stated complaint: L sided facial pain


Time Seen by Provider: 10/20/24 18:15


Source: patient


Mode of arrival: ambulatory


Limitations: no limitations





- History of Present Illness


Initial comments: 





This is a 44-year-old female presenting with left facial pain x 4 days.  Patient

endorses cracking left upper tooth several months ago with no issues until just 

recently.  Patient states pain radiates to upper and lower aspect of face/jaw.  

Describes pain as constant and throbbing (10 out of 10).  Patient states she 

plans on going to the dentist tomorrow but was unable to tolerate the pain any 

longer.  Patient denies pain relief with over-the-counter Motrin.


MD complaint: tooth pain


Onset/Timin


-: days(s)


Location: tooth #





                            __________________________














                            __________________________





 1 - Cracked





Severity scale (1-10): 10


Quality: other (Throbbing)


Consistency: constant


Improves with: NSAID





- Related Data


                                  Previous Rx's











 Medication  Instructions  Recorded


 


Acetaminophen Tab [Tylenol] 650 mg PO Q6HR PRN  tab 18


 


Ibuprofen [Motrin] 600 mg PO Q8HR PRN #20 tab 10/23/22


 


oxyCODONE HCL [OxyIR] 5 mg PO Q6H PRN 3 Days #6 tab 22


 


Amoxic-Pot Clav 875-125Mg 1 tab PO Q12HR #20 tab 10/20/24





[Augmentin 875-125]  


 


Chlorhexidine Gluconate [Periogard] 15 ml MUCOUS MEM DAILY #473 ml 10/20/24











                                    Allergies











Allergy/AdvReac Type Severity Reaction Status Date / Time


 


No Known Allergies Allergy   Verified 22 11:57














Review of Systems


ROS Statement: 


Those systems with pertinent positive or pertinent negative responses have been 

documented in the HPI.





ROS Other: All systems not noted in ROS Statement are negative.





Past Medical History


Past Medical History: Cancer, Skin Disorder


Additional Past Medical History / Comment(s): migraines, hx cellulitis, hx 

anemia, hx cervical cancer


History of Any Multi-Drug Resistant Organisms: None Reported


Past Surgical History:  Section, Orthopedic Surgery, Tubal Ligation


Additional Past Surgical History / Comment(s): MCKINLEYP procedure, left knee 

arthroscopy, oral surgery


Past Anesthesia/Blood Transfusion Reactions: Previous Problems w/ Anesthesia


Additional Past Anesthesia/Blood Transfusion Reaction / Comment(s): spinal 

anesthesia for C/S-"stopped breathing/paralyzed lungs" 


Smoking Status: Former smoker





- Past Family History


  ** Father


Family Medical History: Diabetes Mellitus, Hypertension





  ** Mother


Family Medical History: No Reported History





General Exam





- General Exam Comments


Initial Comments: 





Visual Physical Exam





Vital signs reviewed





General: Well-appearing, nontoxic, no acute distress.


Head: Normocephalic, atraumatic.  Mild left facial edema noted without overlying

erythema


Eyes: PERRLA, EOMI


ENT: Airway patent


Chest: Nonlabored breathing


Skin: No visual rash, normal skin tone


Neuro: Alert and oriented 3


Musculoskeletal: No gross abnormalities





General appearance: alert, in no apparent distress


Head exam: Present: atraumatic, normocephalic, normal inspection


Eye exam: Present: normal appearance, PERRL, EOMI, other (Left upper second 

premolar is mildly cracked with tenderness upon palpation with tongue depressor.

 Negative facial tenderness or overlying erythema.  Patient noted to have 

several missing teeth).  Absent: scleral icterus, conjunctival injection, 

periorbital swelling


ENT exam: Present: normal exam, mucous membranes moist


Neck exam: Present: normal inspection.  Absent: tenderness, meningismus, 

lymphadenopathy


Respiratory exam: Present: normal lung sounds bilaterally.  Absent: respiratory 

distress, wheezes, rales, rhonchi, stridor


Cardiovascular Exam: Present: regular rate, normal rhythm, normal heart sounds. 

Absent: systolic murmur, diastolic murmur, rubs, gallop, clicks


GI/Abdominal exam: Present: soft, normal bowel sounds.  Absent: distended, 

tenderness, guarding, rebound, rigid


Extremities exam: Present: normal inspection, full ROM, normal capillary refill.

 Absent: tenderness, pedal edema, joint swelling, calf tenderness


Back exam: Present: normal inspection


Neurological exam: Present: alert, oriented X3, CN II-XII intact


Psychiatric exam: Present: normal affect, normal mood


Skin exam: Present: warm, dry, intact, normal color.  Absent: rash





Course


                                   Vital Signs











  10/20/24





  18:17


 


Temperature 98.9 F


 


Pulse Rate 80


 


Respiratory 18





Rate 


 


Blood Pressure 146/83


 


O2 Sat by Pulse 100





Oximetry 














Medical Decision Making





- Medical Decision Making





I completed the quick note portion of this chart signed HARDY Jones





Was pt. sent in by a medical professional or institution ([facility] Fonsec[facility], NP, urgent 

care, hospital, or nursing home...) When possible be specific


@ -[No]


Did you speak to anyone other than the patient for history (EMS, parent, family,

 police, friend...)? What history was obtained from this source 


@ -[No]


Did you review nursing and triage notes (agree or disagree)? Why? 


@ -[I reviewed and agree with nursing and triage notes]


Were old charts reviewed (outside hosp., previous admission, EMS record, old 

EKG, old radiological studies, urgent care reports/EKG's, nursing home records)?

Report findings 


@ -[No old charts were reviewed]


Differential Diagnosis (chest pain, altered mental status, abdominal pain women,

abdominal pain men, vaginal bleeding, weakness, fever, dyspnea, syncope, 

headache, dizziness, GI bleed, back pain, seizure, CVA, palpatations, mental 

health, musculoskeletal)? 


@ -Periapical abscess, Jack's angina, damaged dentin, dental kiara, ANUG


EKG interpreted by me (3pts min.).


@ -Not done


X-rays interpreted by me (1pt min.).


@ -Initial x-ray shows left upper canine/premolar damage


CT interpreted by me (1pt min.).


@ -[None done]


U/S interpreted by me (1pt. min.).


@ -[None done]


What testing was considered but not performed or refused? (CT, X-rays, U/S, 

labs)? Why?


@ -[None]


What meds were considered but not given or refused? Why?


@ -[None]


Did you discuss the management of the patient with other professionals 

(professionals i.e. [facility] Fonseca, NP, lab, RT, psych nurse, , , 

teacher, , )? Give summary


@ -[No]


Was smoking cessation discussed for >3mins.?


@ -[No]


Was critical care preformed (if so, how long)?


@ -[No]


Were there social determinants of health that impacted care today? How? 

(Homelessness, low income, unemployed, alcoholism, drug addiction, 

transportation, low edu. Level, literacy, decrease access to med. care, FDC, 

rehab)?


@ -[No]


Was there de-escalation of care discussed even if they declined (Discuss DNR or 

withdrawal of care, Hospice)? DNR status


@ -[No]


What co-morbidities impacted this encounter? (DM, HTN, Smoking, COPD, CAD, 

Cancer, CVA, ARF, Chemo, Hep., AIDS, mental health diagnosis, sleep apnea, 

morbid obesity)?


@ -[None]


Was patient admitted / discharged? Hospital course, mention meds given and 

route, prescriptions, significant lab abnormalities, going to OR and other 

pertinent info.


@ -Discharge.  Facial x-ray shows damage to teeth.  Toradol IM given for pain.  

Augmentin and chlorhexidine mouthwash sent to pharmacy.  Patient sent home with 

T3 starter pack.


Undiagnosed new problem with uncertain prognosis?


@ -[No]


Drug Therapy requiring intensive monitoring for toxicity (Heparin, Nitro, 

Insulin, Cardizem)?


@ -[No]


Were any procedures done?


@ -[No]


Diagnosis/symptom?


@ -Periapical abscess


Acute, or Chronic, or Acute on Chronic?


@ -Acute


Uncomplicated (without systemic symptoms) or Complicated (systemic symptoms)?


@ -Uncomplicated


Side effects of treatment?


@ -[No]


Exacerbation, Progression, or Severe Exacerbation?


@ -[No]


Poses a threat to life or bodily function? How? (Chest pain, USA, MI, pneumonia,

 PE, COPD, DKA, ARF, appy, cholecystitis, CVA, Diverticulitis, Homicidal, 

Suicidal, threat to staff... and all critical care pts)


@ -[No]





Disposition


Clinical Impression: 


 Dental abscess, Fracture of tooth





Disposition: HOME SELF-CARE


Condition: Good


Instructions (If sedation given, give patient instructions):  Dental Abscess 

(ED)


Additional Instructions: 


Swish with warm salt water and apply cold warm compresses.  Advised follow-up 

with dentist.


Prescriptions: 


Amoxic-Pot Clav 875-125Mg [Augmentin 875-125] 1 tab PO Q12HR #20 tab


Chlorhexidine Gluconate [Periogard] 15 ml MUCOUS MEM DAILY #473 ml


Is patient prescribed a controlled substance at d/c from ED?: No


Referrals: 


Timothy Rae DO [Primary Care Provider] - 1-2 days


Time of Disposition: 19:04